# Patient Record
Sex: MALE | Race: WHITE | NOT HISPANIC OR LATINO | Employment: FULL TIME | URBAN - METROPOLITAN AREA
[De-identification: names, ages, dates, MRNs, and addresses within clinical notes are randomized per-mention and may not be internally consistent; named-entity substitution may affect disease eponyms.]

---

## 2018-11-21 ENCOUNTER — OFFICE VISIT (OUTPATIENT)
Dept: NEUROLOGY | Facility: CLINIC | Age: 28
End: 2018-11-21
Payer: COMMERCIAL

## 2018-11-21 VITALS
SYSTOLIC BLOOD PRESSURE: 149 MMHG | RESPIRATION RATE: 16 BRPM | DIASTOLIC BLOOD PRESSURE: 83 MMHG | HEART RATE: 73 BPM | WEIGHT: 201.6 LBS

## 2018-11-21 DIAGNOSIS — G40.309 EPILEPSY, GENERALIZED, CONVULSIVE (H): Primary | ICD-10-CM

## 2018-11-21 RX ORDER — LEVETIRACETAM 1000 MG/1
2000 TABLET ORAL 2 TIMES DAILY
Qty: 120 TABLET | Refills: 11 | Status: SHIPPED | OUTPATIENT
Start: 2018-11-21 | End: 2019-12-19

## 2018-11-21 ASSESSMENT — PAIN SCALES - GENERAL: PAINLEVEL: NO PAIN (0)

## 2018-11-21 NOTE — LETTER
2018       RE: Jatin Norris  : 1990   MRN: [id number]      Dear Colleague,    Thank you for referring your patient, Jatin Norris, to the [facility] EPILEPSY CARE at [facility]. Please see a copy of my visit note below.      [facility] Epilepsy Care History and Physical       Patient:  Jatin Norris  :  1990   Age:  28 year old   Today's Office Visit:  2018    Previous epileptologist: Gela Wilkes, [facility]    History of Present Illness:    Jatin Norris is a 28M who presents to [facility] with a diagnosis of epilepsy. At the age of 15 (roughly [id number]) he had his first GTC while playing a computer game. His family reported that he seemed confused and had word finding difficulty, then had a generalized tonic-clonic seizure. He was initially treated with CBZ but had hives. He was then put on LMT, then LMT + LEV. He ultimately went on LEV monotherapy but due to compliance and dosing issues, continued to have 1-2 GTCs/year, until  when he was consistently taking LEV 2G BID; he has been seizure free since 2014. He recalls 2-3 admissions for 3 day vEEG monitoring where one of the studies had interictal abnormalities, but no seizures recorded. His brain MRI was reportedly normal. In total he suspects he has had 10-20 GTCs in his lifetime. There are no clear provoking factors other than compliance and possibly sleep deprivation. He denies a typical aura but recalls feeling confused with word finding difficulty prior to the events. Seizures are associated with tongue biting but no urinary incontinence. He once had two seizures that clustered but otherwise denies history of status epilepticus. He has never had any other type of spell, staring spells or automatisms. He generally tolerates the LEV well but thinks he is a little more irritable and anxious on it. However, he has not had any 'melt downs' and these symptoms are  mild, not interfering with his work or personal life. He now has near perfect compliance and carries an extra dose of LEV in case he misses a dose.  He has no known epilepsy risk factors: no history of febrile seizures, childhood illnesses or admissions, concussion/trauma, family history of epilepsy.  He developed normally and completed college. 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 He is happily . He is originally from [place], but most recently came from [place]. He moved to [place] 1/2018. He has alcohol once every two weeks, denies tobacco or recreational drug use. He has no history of physical, emotional or sexual abuse.   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 Currently meets criteria to continue to drive.  Previous Evaluations for Epilepsy:   Patient reports 2-3 three hour vEEGs where one study had interictal abnormalities but no seizures recorded  MRI of Brain: reportedly normal  Review of Systems:  Lethargy / Tiredness:  No  Nausea / Vomiting:  No  Double Vision:  No  Sleepiness:  No  Depression:  No  Slowed Cognitive Function:  No  Memory Problems:  No  Poor Balance:  No  Dizziness:  No  Appetite Changes:  No  Blurred Vision:  No  Decreased Libido:  No  Sleep Changes:  No  Behavioral Changes:  No  Skin: negative  Respiratory: No shortness of breath, dyspnea on  exertion, cough, or hemoptysis  Cardiovascular: negative  Have you experienced a traumatic fall related to your events: No  Are these falls related to your seizures: No    Current Outpatient Prescriptions   Medication Sig Dispense Refill     levETIRAcetam (KEPPRA) 1000 MG tablet Take 2 tablets (2,000 mg) by mouth 2 times daily 2 tabs twice daily 120 tablet 11     [DISCONTINUED] LevETIRAcetam (KEPPRA PO) Take 1,000 mg by mouth 2 tabs twice daily         Perceived AED Side Effects: Yes mild anxiety and irritability on LEV, as above    Medication Notes:   AED Medication Compliance:  compliant most of the time and carries extra dose incase he misses one    Past AEDs:  AED - ANTIEPILEPTIC DRUGS 11/21/2018   levETIRAcetam (Oral) 2,000 MG BID + Take 1,000 mg by mouth 2 tabs twice daily-Discontinued (Reorder)       Exam:    /83  Pulse 73  Resp 16  Wt 201 lb 9.6 oz (91.4 kg)     Wt Readings from Last 5 Encounters:   11/21/18 201 lb 9.6 oz (91.4 kg)       General Appearance: Normal  Skin: Normal  HEENT: Not Examined  Heart: Not Examined  Peripheral Pulses: Normal  Abdomen: Not Examined  Gait:  Normal  Attention Span:  Normal  Language:  Normal  Extraocular Movements:  Normal  Coordination:  Normal  Visual Fields:  Normal  Facial Sensation:  Normal  Facial Strength:  Normal  Tongue Strength:  Normal  Limb Strength:  Normal  Limb Tone:  Normal  Limb Sensation:  Normal  General Physical Findings:  Normal    MS: Alert, oriented, interacting appropriately. Language intact to conversation.   CN: CELESTINA, EOMI without nystagmus, VFF, face symmetric, tongue midline, not dysarthric, shoulder shrug symmetric.  Motor: No abnormal movements or tone. Full strength at deltoids, biceps, triceps, hip flexors, knee extensors, ankles. Finger tapping symmetric.  Reflexes: 2/4 at BR, biceps, patellars. No ankle clonus.   Coordination: FNF not dysmetric.   Sensation: Intact to light touch and vibration in all four.  Gait: Normal base and  stride. Able to perform tandem gait.     Assessment and Plan:   Jatin Norris is a 28M who presents to establish care for a diagnosis of epilepsy. He has only one seizure semiology, generalized tonic clonic seizures. 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 By the patient's report, he has had 10-20 GTCs since the age of 15 but he has been seizure free since 2014 when he established good compliance. Overall he is tolerating LEV well but would like to try generic LEV for financial reasons. We discussed that we can check a level today and once his brand name drug runs out, he can begin generic LEV with repeat level 1-2 weeks later. Before this, however, we would like to get a 3 hour EEG to better understand his epilepsy diagnosis. He plans to send the MRI report to us. Plan to see this patient back in three months.     We reviewed [place] regulations on seizures and driving with the patient and they appeared to clearly understand that they are responsible for reporting diagnosis of epilepsy to the DMV and prohibited from operating a motor vehicle for 3 months following any seizure. I also recommended they avoid any activities that might lead to self-injury or injury of others for 3 months following any seizure with impaired awareness or motor control like holding young children at heights from which they might be injured if dropped, avoiding situations in which they or someone else might drown without their continuous awareness, and operating power tools, firearms, and other high-powered devices.    - Check LEV level today  - Three hour vEEG   - Then, plan transition to generic levetiracetam 2000 mg BID   - Recheck LEV level 1-2 weeks after beginning generic LEV  - Return to clinic in 3 months     As described above, I met with the patient for 60 minutes and during this time counseling was greater  than 50% of the visit time.    The patient was seen and discussed with Dr. Cody.    Valerie Bay  Neurology PGY3    Report Prepared By: Valerie Bay MD, Neurology Resident  I agree with the findings and plan of care as documented.  I personally examined the patient, with whom I discussed our diagnostic impressions and therapeutic recommendations.       I spent 65 minutes in this patient care, more than 50% of which consisted of counseling and coordinating care.         Samuel Cody M.D.   Professor of Neurology      Again, thank you for allowing me to participate in the care of your patient.      Sincerely,    Samuel Cody MD

## 2018-11-21 NOTE — LETTER
December 8, 2018      Jatin Norris  [address]   [place] [address]        Dear ,    We are writing to inform you of your test results.      The levetiracetam level was below the target range for seizure control.  You should be certain that you are taking the full prescribed dose every day.      Resulted Orders   Keppra (Levetiracetam) Level   Result Value Ref Range    Keppra (Levetiracetam) Level 42 12 - 46 ug/mL      Comment:      (Note)  INTERPRETIVE INFORMATION: Keppra (Levetiracetam)  Therapeutic Range:  12-46 ug/mL             Toxic:  Not well Established  Pharmacokinetics of levetiracetam are affected by renal   function. Adverse effects may include somnolence, weakness,   headache and vomiting.  This levetiracetam (Keppra) immunoassay uses the [employer] reagents, which has known cross-reactivity with   the drug brivaracetam (Briviact) and may report inaccurate   results. Patients transitioning from levetiracetam to   brivaracetam or those who are using both medications should   not monitor drug concentrations with the [employer]   assay. These patients should be monitored using a validated   chromatographic methodology that distinguishes between   drugs to determine drug concentrations.  Performed by [facility],  [address] [place] [id number] [phone]  [email or web address], Armando Song MD, Lab. Director         If you have any questions or concerns, please call the clinic at the number listed above.       Sincerely,        Samuel Cody MD

## 2018-11-21 NOTE — MR AVS SNAPSHOT
After Visit Summary   2018    Jatin Norris    MRN: [id number]           Patient Information     Date Of Birth          1990        Visit Information        Provider Department      2018 10:30 AM Samuel Cody MD [facility]        Today's Diagnoses     Epilepsy, generalized, convulsive (H)    -  1       Follow-ups after your visit        Follow-up notes from your care team     Return in about 3 months (around 2019).      Your next 10 appointments already scheduled     Mar 13, 2019  2:30 PM CDT   Return Visit with MD [facility] ([facility])    [address], [address]  [facility] [address] [phone]              Future tests that were ordered for you today     Open Future Orders        Priority Expected Expires Ordered    EEG video monitoring Routine 2018 3/21/2019 2018            Who to contact     Please call your clinic at [phone] to:    Ask questions about your health    Make or cancel appointments    Discuss your medicines    Learn about your test results    Speak to your doctor            Additional Information About Your Visit        MyChart Information     Smart Checkout is an electronic gateway that provides easy, online access to your medical records. With Smart Checkout, you can request a clinic appointment, read your test results, renew a prescription or communicate with your care team.     To sign up for Smart Checkout visit the website at [email or web address]   You will be asked to enter the access code listed below, as well as some personal information. Please follow the directions to create your username and password.     Your access code is: [email or web address]  Expires: 2019  4:55 PM     Your access code will  in 90 days. If you need help or a new code, please contact your [facility] or call [phone] for assistance.        Care EveryWhere ID      This is your Care EveryWhere ID. This could be used by other organizations to access your [facility] medical records  [id number]        Your Vitals Were     Pulse Respirations                73 16           Blood Pressure from Last 3 Encounters:   11/21/18 149/83    Weight from Last 3 Encounters:   11/21/18 201 lb 9.6 oz (91.4 kg)              We Performed the Following     Keppra (Levetiracetam) Level          Today's Medication Changes          These changes are accurate as of 11/21/18 11:31 AM.  If you have any questions, ask your nurse or doctor.               These medicines have changed or have updated prescriptions.        Dose/Directions    levETIRAcetam 1000 MG tablet   Commonly known as:  KEPPRA   This may have changed:    - medication strength  - how much to take  - when to take this   Used for:  Epilepsy, generalized, convulsive (H)   Changed by:  Samuel oCdy MD        Dose:  2000 mg   Take 2 tablets (2,000 mg) by mouth 2 times daily 2 tabs twice daily   Quantity:  120 tablet   Refills:  11            Where to get your medicines      These medications were sent to [id number] [address]. [address] [place] [id number]     Phone:  [phone]     levETIRAcetam 1000 MG tablet                Primary Care Provider    None Specified       No primary provider on file.        Equal Access to Services     DEANGELO VALENTINE AH: Grace fitzgeraldo Soasad, waaxda luqadaha, qaybta kaalmada adeegyada, hailey blanco. So [facility] [phone].    ATENCIÓN: Si habla español, tiene a camejo disposición servicios gratuitos de asistencia lingüística. Layne al [phone].    We comply with applicable federal civil rights laws and [place] laws. We do not discriminate on the basis of race, color, national origin, age, disability, sex, sexual orientation, or gender identity.            Thank you!     Thank you for choosing [facility] EPILEPSY [facility]   for your care. Our goal is always to provide you with excellent care. Hearing back from our patients is one way we can continue to improve our services. Please take a few minutes to complete the written survey that you may receive in the mail after your visit with us. Thank you!             Your Updated Medication List - Protect others around you: Learn how to safely use, store and throw away your medicines at www.disposemymeds.org.          This list is accurate as of 11/21/18 11:31 AM.  Always use your most recent med list.                   Brand Name Dispense Instructions for use Diagnosis    levETIRAcetam 1000 MG tablet    KEPPRA    120 tablet    Take 2 tablets (2,000 mg) by mouth 2 times daily 2 tabs twice daily    Epilepsy, generalized, convulsive (H)

## 2018-11-21 NOTE — LETTER
Date:November 27, 2018      Patient was self referred, no letter generated. Do not send.        [facility] Information

## 2018-11-21 NOTE — PROGRESS NOTES
[facility]       Patient:  Jatin Norris  :  1990   Age:  28 year old   Today's Office Visit:  2018    Previous epileptologist: Gela Wilkes, [facility]    History of Present Illness:    Jatin Norris is a 28M who presents to [facility] with a diagnosis of epilepsy. At the age of 15 (roughly [id number]) he had his first GTC while playing a computer game. His family reported that he seemed confused and had word finding difficulty, then had a generalized tonic-clonic seizure. He was initially treated with CBZ but had hives. He was then put on LMT, then LMT + LEV. He ultimately went on LEV monotherapy but due to compliance and dosing issues, continued to have 1-2 GTCs/year, until  when he was consistently taking LEV 2G BID; he has been seizure free since . He recalls 2-3 admissions for 3 day vEEG monitoring where one of the studies had interictal abnormalities, but no seizures recorded. His brain MRI was reportedly normal. In total he suspects he has had 10-20 GTCs in his lifetime. There are no clear provoking factors other than compliance and possibly sleep deprivation. He denies a typical aura but recalls feeling confused with word finding difficulty prior to the events. Seizures are associated with tongue biting but no urinary incontinence. He once had two seizures that clustered but otherwise denies history of status epilepticus. He has never had any other type of spell, staring spells or automatisms. He generally tolerates the LEV well but thinks he is a little more irritable and anxious on it. However, he has not had any 'melt downs' and these symptoms are mild, not interfering with his work or personal life. He now has near perfect compliance and carries an extra dose of LEV in case he misses a dose.  He has no known epilepsy risk factors: no history of febrile seizures, childhood illnesses or admissions, concussion/trauma,  family history of epilepsy.  He developed normally and completed college. 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 He is originally from [place], but most recently came from [place]. He moved to [place] 1/2018. He has alcohol once every two weeks, denies tobacco or recreational drug use. He has no history of physical, emotional or sexual abuse.   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 Currently meets criteria to continue to drive.  Previous Evaluations for Epilepsy:   Patient reports 2-3 three hour vEEGs where one study had interictal abnormalities but no seizures recorded  MRI of Brain: reportedly normal  Review of Systems:  Lethargy / Tiredness:  No  Nausea / Vomiting:  No  Double Vision:  No  Sleepiness:  No  Depression:  No  Slowed Cognitive Function:  No  Memory Problems:  No  Poor Balance:  No  Dizziness:  No  Appetite Changes:  No  Blurred Vision:  No  Decreased Libido:  No  Sleep Changes:  No  Behavioral Changes:  No  Skin: negative  Respiratory: No shortness of breath, dyspnea on exertion, cough, or hemoptysis  Cardiovascular: negative  Have you experienced a traumatic fall related to your events: No  Are these falls related to your seizures: No    Current Outpatient Prescriptions   Medication Sig Dispense Refill     levETIRAcetam (KEPPRA) 1000 MG  tablet Take 2 tablets (2,000 mg) by mouth 2 times daily 2 tabs twice daily 120 tablet 11     [DISCONTINUED] LevETIRAcetam (KEPPRA PO) Take 1,000 mg by mouth 2 tabs twice daily         Perceived AED Side Effects: Yes mild anxiety and irritability on LEV, as above    Medication Notes:   AED Medication Compliance:  compliant most of the time and carries extra dose incase he misses one    Past AEDs:  AED - ANTIEPILEPTIC DRUGS 11/21/2018   levETIRAcetam (Oral) 2,000 MG BID + Take 1,000 mg by mouth 2 tabs twice daily-Discontinued (Reorder)       Exam:    /83  Pulse 73  Resp 16  Wt 201 lb 9.6 oz (91.4 kg)     Wt Readings from Last 5 Encounters:   11/21/18 201 lb 9.6 oz (91.4 kg)       General Appearance: Normal  Skin: Normal  HEENT: Not Examined  Heart: Not Examined  Peripheral Pulses: Normal  Abdomen: Not Examined  Gait:  Normal  Attention Span:  Normal  Language:  Normal  Extraocular Movements:  Normal  Coordination:  Normal  Visual Fields:  Normal  Facial Sensation:  Normal  Facial Strength:  Normal  Tongue Strength:  Normal  Limb Strength:  Normal  Limb Tone:  Normal  Limb Sensation:  Normal  General Physical Findings:  Normal    MS: Alert, oriented, interacting appropriately. Language intact to conversation.   CN: CELESTINA, EOMI without nystagmus, VFF, face symmetric, tongue midline, not dysarthric, shoulder shrug symmetric.  Motor: No abnormal movements or tone. Full strength at deltoids, biceps, triceps, hip flexors, knee extensors, ankles. Finger tapping symmetric.  Reflexes: 2/4 at BR, biceps, patellars. No ankle clonus.   Coordination: FNF not dysmetric.   Sensation: Intact to light touch and vibration in all four.  Gait: Normal base and stride. Able to perform tandem gait.     Assessment and Plan:   Jatin Norris is a 28M who presents to establish care for a diagnosis of epilepsy. He has only one seizure semiology, generalized tonic clonic seizures. History is most consistent with an idiopathic  generalized epilepsy, though we are awaiting records of previous EEGs and MRI from [facility]. By the patient's report, he has had 10-20 GTCs since the age of 15 but he has been seizure free since 2014 when he established good compliance. Overall he is tolerating LEV well but would like to try generic LEV for financial reasons. We discussed that we can check a level today and once his brand name drug runs out, he can begin generic LEV with repeat level 1-2 weeks later. Before this, however, we would like to get a 3 hour EEG to better understand his epilepsy diagnosis. He plans to send the MRI report to us. Plan to see this patient back in three months.     We reviewed [place] regulations on seizures and driving with the patient and they appeared to clearly understand that they are responsible for reporting diagnosis of epilepsy to the DMV and prohibited from operating a motor vehicle for 3 months following any seizure. I also recommended they avoid any activities that might lead to self-injury or injury of others for 3 months following any seizure with impaired awareness or motor control like holding young children at heights from which they might be injured if dropped, avoiding situations in which they or someone else might drown without their continuous awareness, and operating power tools, firearms, and other high-powered devices.    - Check LEV level today  - Three hour vEEG   - Then, plan transition to generic levetiracetam 2000 mg BID   - Recheck LEV level 1-2 weeks after beginning generic LEV  - Return to clinic in 3 months     As described above, I met with the patient for 60 minutes and during this time counseling was greater than 50% of the visit time.    The patient was seen and discussed with Dr. Cody.    Valerie Bay  Neurology PGY3    Report Prepared By: Valerie Bay MD, Neurology Resident  I agree with the findings and plan of care as documented.  I personally examined the  patient, with whom I discussed our diagnostic impressions and therapeutic recommendations.       I spent 65 minutes in this patient care, more than 50% of which consisted of counseling and coordinating care.         Samuel Cody M.D.   Professor of Neurology

## 2018-11-23 LAB — LEVETIRACETAM SERPL-MCNC: 42 UG/ML (ref 12–46)

## 2019-12-18 DIAGNOSIS — G40.309 EPILEPSY, GENERALIZED, CONVULSIVE (H): Primary | ICD-10-CM

## 2019-12-19 RX ORDER — LEVETIRACETAM 1000 MG/1
TABLET ORAL
Qty: 120 TABLET | Refills: 6 | Status: SHIPPED | OUTPATIENT
Start: 2019-12-19 | End: 2020-01-29

## 2020-01-29 ENCOUNTER — OFFICE VISIT (OUTPATIENT)
Dept: NEUROLOGY | Facility: CLINIC | Age: 30
End: 2020-01-29
Payer: COMMERCIAL

## 2020-01-29 VITALS — DIASTOLIC BLOOD PRESSURE: 75 MMHG | HEART RATE: 73 BPM | WEIGHT: 214.4 LBS | SYSTOLIC BLOOD PRESSURE: 148 MMHG

## 2020-01-29 DIAGNOSIS — G40.309 EPILEPSY, GENERALIZED, CONVULSIVE (H): Primary | ICD-10-CM

## 2020-01-29 RX ORDER — LEVETIRACETAM 1000 MG/1
2000 TABLET ORAL
Qty: 360 TABLET | Refills: 3 | Status: SHIPPED | OUTPATIENT
Start: 2020-01-29 | End: 2021-02-08

## 2020-01-29 ASSESSMENT — PAIN SCALES - GENERAL: PAINLEVEL: NO PAIN (0)

## 2020-01-29 NOTE — PROGRESS NOTES
[facility]  Epilepsy Clinic: RETURN VISIT           Service Date: 01/29/2020     Last time visit date: 11/21/2018     History of Present Illness and ictal semiology:    Jatin Norris is a 29 M who presents to [facility] care with a diagnosis of epilepsy. At the age of 15 (roughly [id number]) he had his first GTC while playing a computer game. His family reported that he seemed confused and had word finding difficulty, then had a generalized tonic-clonic seizure.     Interval history:   He was low on keppra and he needed a new prescription, insurance did not cover keppra, they cover the generic levetiracetam. No seizure since the last time. He feels his anxiety level is up he thinks. It seems more pronounced. It does affect his work. At home he feels he is little on edge.     He was initially treated with CBZ but had hives. He was then put on LMT, then LMT + LEV. He ultimately went on LEV monotherapy but due to compliance and dosing issues, continued to have 1-2 GTCs/year, until 2004 when he was consistently taking LEV 2G BID; he has been seizure free since 2014. He recalls 2-3 admissions for 3 day vEEG monitoring where one of the studies had interictal abnormalities, but no seizures recorded. His brain MRI was reportedly normal. In total he suspects he has had 10-20 GTCs in his lifetime. He denies a typical aura but recalls feeling confused with word finding difficulty prior to the events. Seizures are associated with tongue biting but no urinary incontinence. He once had two seizures that clustered but otherwise denies history of status epilepticus. He has never had any other type of spell, staring spells or automatisms. He generally tolerates the LEV well but thinks he is a little more irritable and anxious on it. However, he has not had any 'melt downs' and these symptoms are mild, not interfering with his work or personal life. He now has near perfect compliance and carries an extra dose of LEV in case he  misses a dose.    Epilepsy-seizure predispositions:   He has no known epilepsy risk factors: no history of febrile seizures, childhood illnesses or admissions, concussion/trauma, family history of epilepsy. He developed normally    Laboratory evaluations:   Brain MRI was reportedly normal  He recalls 2-3 admissions for 3 day vEEG monitoring where one of the studies had interictal abnormalities, but no seizures recorded.    Epilepsy therapeutics:   1-Current AEDs:  levetiracetam 2000 mg BID   2-Failed AEDs: carbamazepine caused hives  Lamotrigine was stopped for unknown reason    Drug levels: on 11/21/2018:  levetiracetam level 42    PERSONAL AND SOCIAL HISTORY:  completed college. 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 He is happily . He is originally from [place], but most recently came from [place]. He moved to [place] 1/2018. He has alcohol once every two weeks, denies tobacco or recreational drug use. He has no history of physical, emotional or sexual abuse.     Epilepsy Risk Factors:  None  Precipitating factors:   Failure to take AED  No PMH, PSH, no pertinent FH     Social History            Social History     Marital status:        Spouse name: N/A     Number of children: 0     Years of education: College degree              Social History Main Topics      Smoking status: Never Smoker      Smokeless tobacco: Never Used      Alcohol use Yes         Comment: every couple weeks      Drug use: No      Sexual activity: Not Asked            Other Topics Concern     None      Social History Narrative     None      Employment/School:  Employed full time as salesman for [employer], see above  Driving:  Currently patient is:  Driving: Patient was made aware of [place] driving laws. Currently meets criteria to continue to drive.  Previous Evaluations for Epilepsy:   Patient reports 2-3 three hour vEEGs where one study had interictal abnormalities but no seizures recorded  MRI of Brain: reportedly normal  Review of  Systems:  Lethargy / Tiredness:  No  Nausea / Vomiting:  No  Double Vision:  No  Sleepiness:  No  Depression:  No  Slowed Cognitive Function:  No  Memory Problems:  No  Poor Balance:  No  Dizziness:  No  Appetite Changes:  No  Blurred Vision:  No  Decreased Libido:  No  Sleep Changes:  No  Behavioral Changes:  No  Skin: negative  Respiratory: No shortness of breath, dyspnea on exertion, cough, or hemoptysis  Cardiovascular: negative  Have you experienced a traumatic fall related to your events: No  Are these falls related to your seizures: No     Current Outpatient Prescriptions          Current Outpatient Prescriptions   Medication Sig Dispense Refill     levETIRAcetam (KEPPRA) 1000 MG tablet Take 2 tablets (2,000 mg) by mouth 2 times daily 2 tabs twice daily 120 tablet 11     [DISCONTINUED] LevETIRAcetam (KEPPRA PO) Take 1,000 mg by mouth 2 tabs twice daily                Perceived AED Side Effects: Yes mild anxiety and irritability on LEV, as above     Medication Notes:   AED Medication Compliance:  compliant most of the time and carries extra dose incase he misses one     Past AEDs:  AED - ANTIEPILEPTIC DRUGS 11/21/2018   levETIRAcetam (Oral) 2,000 MG BID + Take 1,000 mg by mouth 2 tabs twice daily-Discontinued (Reorder)         Exam:     /83  Pulse 73  Resp 16  Wt 201 lb 9.6 oz (91.4 kg)          Wt Readings from Last 5 Encounters:   11/21/18 201 lb 9.6 oz (91.4 kg)         General Appearance: Normal  Skin: Normal  HEENT: Not Examined  Heart: Not Examined  Peripheral Pulses: Normal  Abdomen: Not Examined  Gait:  Normal  Attention Span:  Normal  Language:  Normal  Extraocular Movements:  Normal  Coordination:  Normal  Visual Fields:  Normal  Facial Sensation:  Normal  Facial Strength:  Normal  Tongue Strength:  Normal  Limb Strength:  Normal  Limb Tone:  Normal  Limb Sensation:  Normal  General Physical Findings:  Normal     MS: Alert, oriented, interacting appropriately. Language intact to  conversation.   CN: CELESTINA, EOMI without nystagmus, VFF, face symmetric, tongue midline, not dysarthric, shoulder shrug symmetric.  Motor: No abnormal movements or tone. Full strength at deltoids, biceps, triceps, hip flexors, knee extensors, ankles. Finger tapping symmetric.  Reflexes: 2/4 at BR, biceps, patellars. No ankle clonus.   Coordination: FNF not dysmetric.   Sensation: Intact to light touch and vibration in all four.  Gait: Normal base and stride. Able to perform tandem gait.      Assessment and Plan:   Jatin Norris is a 29 M who presents to epilepsy clinic for follow up today. He has idiopathic generalized epilepsy that was well controlled on  levetiracetam 2000 mg BID.     He has had mild anxiety, and is uncertain as to whether levetiracetam is a contributory factor.  He also noted work stressors related to a failed project recently and home stressors related to planning an upcoming wedding as anxiety-provoking circumstances.  Overal, e does not want to discontinue or reduce dosing of levetiracetam.     We reviewed [place] regulations on seizures and driving with the patient and they appeared to clearly understand that they are responsible for reporting diagnosis of epilepsy to the DMV and prohibited from operating a motor vehicle for 3 months following any seizure. I also recommended they avoid any activities that might lead to self-injury or injury of others for 3 months following any seizure with impaired awareness or motor control like holding young children at heights from which they might be injured if dropped, avoiding situations in which they or someone else might drown without their continuous awareness, and operating power tools, firearms, and other high-powered devices.     Plan:  -Continue levetiracetam 2000 mg BID   -Return to clinic in 11 months     The patient was seen and discussed with Dr. Escobar Hobbs  Neurology resident   Pager: [phone]    Report Prepared By: Joseph  MD Anjel, Neurology Resident   I agree with the findings and plan of care as documented.  I personally examined the patient, and discussed our epilepsy diagnostic impressions and therapeutic recommendations with the patient.  She was agreeable to this plan.       I spent 25 minutes in this patient care, more than 50% of which consisted of counseling and coordinating care.          Samuel Cody M.D.   Professor of Neurology

## 2020-01-29 NOTE — LETTER
Date:January 30, 2020      Patient was self referred, no letter generated. Do not send.        [facility] Health Information

## 2020-01-29 NOTE — LETTER
2020       RE: Jatin Norris  : 1990   MRN: [id number]      Dear Colleague,    Thank you for referring your patient, Jatin Norris, to the [facility] EPILEPSY CARE at [facility]. Please see a copy of my visit note below.      [facility]  Epilepsy Clinic: RETURN VISIT           Service Date: 2020     Last time visit date: 2018     History of Present Illness and ictal semiology:    Jatin Norris is a 29 M who presents to [facility] care with a diagnosis of epilepsy. At the age of 15 (roughly [id number]) he had his first GTC while playing a computer game. His family reported that he seemed confused and had word finding difficulty, then had a generalized tonic-clonic seizure.     Interval history:   He was low on keppra and he needed a new prescription, insurance did not cover keppra, they cover the generic levetiracetam. No seizure since the last time. He feels his anxiety level is up he thinks. It seems more pronounced. It does affect his work. At home he feels he is little on edge.     He was initially treated with CBZ but had hives. He was then put on LMT, then LMT + LEV. He ultimately went on LEV monotherapy but due to compliance and dosing issues, continued to have 1-2 GTCs/year, until 2004 when he was consistently taking LEV 2G BID; he has been seizure free since 2014. He recalls 2-3 admissions for 3 day vEEG monitoring where one of the studies had interictal abnormalities, but no seizures recorded. His brain MRI was reportedly normal. In total he suspects he has had 10-20 GTCs in his lifetime. He denies a typical aura but recalls feeling confused with word finding difficulty prior to the events. Seizures are associated with tongue biting but no urinary incontinence. He once had two seizures that clustered but otherwise denies history of status epilepticus. He has never had any other type of spell, staring spells or automatisms. He generally  tolerates the LEV well but thinks he is a little more irritable and anxious on it. However, he has not had any 'melt downs' and these symptoms are mild, not interfering with his work or personal life. He now has near perfect compliance and carries an extra dose of LEV in case he misses a dose.    Epilepsy-seizure predispositions:   He has no known epilepsy risk factors: no history of febrile seizures, childhood illnesses or admissions, concussion/trauma, family history of epilepsy. He developed normally    Laboratory evaluations:   Brain MRI was reportedly normal  He recalls 2-3 admissions for 3 day vEEG monitoring where one of the studies had interictal abnormalities, but no seizures recorded.    Epilepsy therapeutics:   1-Current AEDs:  levetiracetam 2000 mg BID   2-Failed AEDs: carbamazepine caused hives  Lamotrigine was stopped for unknown reason    Drug levels: on 11/21/2018:  levetiracetam level 42    PERSONAL AND SOCIAL HISTORY:  completed college. 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 He is originally from [place], but most recently came from [place]. He moved to [place] 1/2018. He has alcohol once every two weeks, denies tobacco or recreational drug use. He has no history of physical, emotional or sexual abuse.     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 Currently meets criteria to continue to drive.  Previous Evaluations for Epilepsy:   Patient reports 2-3 three hour vEEGs where one study had interictal abnormalities but no seizures recorded  MRI of Brain: reportedly normal  Review of Systems:  Lethargy / Tiredness:  No  Nausea / Vomiting:  No  Double Vision:  No  Sleepiness:  No  Depression:  No  Slowed Cognitive Function:  No  Memory Problems:  No  Poor Balance:  No  Dizziness:  No  Appetite Changes:  No  Blurred Vision:  No  Decreased Libido:  No  Sleep Changes:  No  Behavioral Changes:  No  Skin: negative  Respiratory: No shortness of breath, dyspnea on exertion, cough, or hemoptysis  Cardiovascular: negative  Have you experienced a traumatic fall related to your events: No  Are these falls related to your seizures: No     Current Outpatient Prescriptions          Current Outpatient Prescriptions   Medication Sig Dispense Refill     levETIRAcetam (KEPPRA) 1000 MG tablet Take 2 tablets (2,000 mg) by mouth 2 times daily 2 tabs twice daily 120 tablet 11     [DISCONTINUED] LevETIRAcetam (KEPPRA PO) Take 1,000 mg by mouth 2 tabs twice daily                Perceived AED Side Effects: Yes mild anxiety and irritability on LEV, as above     Medication Notes:   AED Medication Compliance:  compliant most of the time and carries extra dose incase he misses one     Past AEDs:  AED - ANTIEPILEPTIC DRUGS 11/21/2018   levETIRAcetam (Oral) 2,000 MG BID + Take 1,000 mg by mouth 2 tabs twice daily-Discontinued (Reorder)         Exam:     /83  Pulse 73  Resp 16  Wt 201 lb 9.6 oz (91.4 kg)          Wt Readings from Last 5 Encounters:   11/21/18 201 lb 9.6 oz (91.4 kg)         General Appearance: Normal  Skin: Normal  HEENT: Not Examined  Heart: Not Examined  Peripheral Pulses: Normal  Abdomen: Not Examined  Gait:  Normal  Attention Span:  Normal  Language:  Normal  Extraocular Movements:  Normal  Coordination:   Normal  Visual Fields:  Normal  Facial Sensation:  Normal  Facial Strength:  Normal  Tongue Strength:  Normal  Limb Strength:  Normal  Limb Tone:  Normal  Limb Sensation:  Normal  General Physical Findings:  Normal     MS: Alert, oriented, interacting appropriately. Language intact to conversation.   CN: CELESTINA, EOMI without nystagmus, VFF, face symmetric, tongue midline, not dysarthric, shoulder shrug symmetric.  Motor: No abnormal movements or tone. Full strength at deltoids, biceps, triceps, hip flexors, knee extensors, ankles. Finger tapping symmetric.  Reflexes: 2/4 at BR, biceps, patellars. No ankle clonus.   Coordination: FNF not dysmetric.   Sensation: Intact to light touch and vibration in all four.  Gait: Normal base and stride. Able to perform tandem gait.      Assessment and Plan:   Jatin Norris is a 29 M who presents to epilepsy clinic for follow up today. He has idiopathic generalized epilepsy that was well controlled on  levetiracetam 2000 mg BID.     He has had mild anxiety, and is uncertain as to whether levetiracetam is a contributory factor.  He also noted work stressors related to a failed project recently and home stressors related to planning an upcoming wedding as anxiety-provoking circumstances.  Overal, e does not want to discontinue or reduce dosing of levetiracetam.     We reviewed [place] regulations on seizures and driving with the patient and they appeared to clearly understand that they are responsible for reporting diagnosis of epilepsy to the DMV and prohibited from operating a motor vehicle for 3 months following any seizure. I also recommended they avoid any activities that might lead to self-injury or injury of others for 3 months following any seizure with impaired awareness or motor control like holding young children at heights from which they might be injured if dropped, avoiding situations in which they or someone else might drown without their continuous awareness, and  operating power tools, firearms, and other high-powered devices.     Plan:  -Continue levetiracetam 2000 mg BID   -Return to clinic in 11 months     The patient was seen and discussed with Dr. Escobar Hobbs  Neurology resident   Pager: [phone]    Report Prepared By: Joseph Hobbs MD, Neurology Resident   I agree with the findings and plan of care as documented.  I personally examined the patient, and discussed our epilepsy diagnostic impressions and therapeutic recommendations with the patient.  She was agreeable to this plan.       I spent 25 minutes in this patient care, more than 50% of which consisted of counseling and coordinating care.          Samuel Cody M.D.   Professor of Neurology       Again, thank you for allowing me to participate in the care of your patient.      Sincerely,    Samuel Cody MD

## 2021-02-05 DIAGNOSIS — G40.309 EPILEPSY, GENERALIZED, CONVULSIVE (H): ICD-10-CM

## 2021-02-08 NOTE — TELEPHONE ENCOUNTER
levETIRAcetam (KEPPRA) 1000 MG tablet      Last Written Prescription Date:1/29/20  Last Fill Quantity:360,   # refills: 3  Last Office Visit :  1/29/20  Future Office visit:  None    Scheduling has been notified to contact the pt for appointment.    routed because: dosing alert

## 2021-02-09 RX ORDER — LEVETIRACETAM 1000 MG/1
2000 TABLET ORAL 2 TIMES DAILY
Qty: 120 TABLET | Refills: 0 | Status: SHIPPED | OUTPATIENT
Start: 2021-02-09 | End: 2021-02-24

## 2021-02-24 ENCOUNTER — VIRTUAL VISIT (OUTPATIENT)
Dept: NEUROLOGY | Facility: CLINIC | Age: 31
End: 2021-02-24
Payer: COMMERCIAL

## 2021-02-24 DIAGNOSIS — G40.309 EPILEPSY, GENERALIZED, CONVULSIVE (H): Primary | ICD-10-CM

## 2021-02-24 RX ORDER — LEVETIRACETAM 1000 MG/1
2000 TABLET ORAL 2 TIMES DAILY
Qty: 360 TABLET | Refills: 3 | Status: SHIPPED | OUTPATIENT
Start: 2021-02-24 | End: 2022-02-18

## 2021-02-24 NOTE — PROGRESS NOTES
Jatin Norris is a 30 year old who is being evaluated via a billable video visit.    How would you like to obtain your AVS? Mail a copy  If the video visit is dropped, the invitation should be resent by: Send to e-mail at: [email or web address]  Will anyone else be joining your video visit? No      EPILEPSY CLINIC VIDEO VISIT NOTE  The scheduled clinic visit was changed to a video visit to reduce the risk of COVID-19 transmission.           Service Date: 02/24/2021    HISTORY: I spoke with Mr. 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  He was alone during the video visit today.      The patient denied having recent fever or cough, and exposure to anyone thought to have COVID-19.     Following the most recent visit to this clinic on 01/29/2020, the patient reportedly has had no seizures.  He missing doses of levetiracetam and denied adverse effects of levetiracetam.  The most recent seizure was a grand mal seizure in 2014.      He had severe right flank pain in November 2020, and passed a kidney stone.  Apparently, the stone was not recovered, so no chemical analysis occurred.  Follow-up CT imaging showed a single nonobstructing left renal calculus.  He has pursued daily hydration since then.      MEDICATIONS:  Levetiracetam 2000 mg b.i.d., and other medications as per the electronic medical record.     VIDEO OBSERVATIONS:   The patient spoke with normal articulation, fluency and syntax, with normal comprehension of questions.    On command, the patient moved the direction of gaze into all four quadrants conjugately and without nystagmus.   Facial movements were normal.    Tongue protruded on the midline.    The patient did not display any resting tremors or action tremors of the outstretched arms.  Limb movements were normal.      LABORATORY DATA:   AED levels on 11/21/2018:   Levetiracetam: 42 mcg/ml.     IMPRESSION:   The patient remains  seizure-free, with no apparent AEs of levetiracetam.  We agreed to continue the current dose.    The patient had questions concerning the relationship between levetiracetam and renal calculus formation.  I told him that this agent is unlikely to promote stone formation, based on its chemistry.  We discussed renal elimination of the drug.  Serum chemistries, obtained shortly after he had passed the stone in November 2020, included normal BUN and creatinine values.  I told him that it appears unlikely that any effects of the stones would affect levetiracetam kinetics.    He is now working entirely from home.  We reviewed COVID[employer] restrictions.     PLAN:   1)  Continue levetiracetam at the current dose.    2)  Return in 11 months for in-person clinic visit.    Video Conference via [employer].   Video Start Time: 11:30 a.m.   Video Stop Time: 11:48 a.m.   Provider location: [facility]   Reported Patient Location: Home     I personally spent 20 minutes in this patient care on the day of this visit, including time in direct contact with the patient of which more than 50% consisted of counseling and coordinating care, and time in other necessary patient care activities without direct patient contact including medical record and imaging review.       Samuel Cody M.D., Professor of Neurology

## 2021-02-24 NOTE — LETTER
2021       RE: Jatin Norris  : 1990   MRN: [id number]      Dear Colleague,    Thank you for referring your patient, Jatin Norris, to the [facility] at [facility]. Please see a copy of my visit note below.    Left messages to call back for rooming process before video appointment.         Jatin Norris is a 30 year old who is being evaluated via a billable video visit.    How would you like to obtain your AVS? Mail a copy  If the video visit is dropped, the invitation should be resent by: Send to e-mail at: [email or web address]  Will anyone else be joining your video visit? No      EPILEPSY CLINIC VIDEO VISIT NOTE  The scheduled clinic visit was changed to a video visit to reduce the risk of COVID-19 transmission.           Service Date: 2021    HISTORY: I spoke with Mr. 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  He was alone during the video visit today.      The patient denied having recent fever or cough, and exposure to anyone thought to have COVID-19.     Following the most recent visit to this clinic on 2020, the patient reportedly has had no seizures.  He missing doses of levetiracetam and denied adverse effects of levetiracetam.  The most recent seizure was a grand mal seizure in .      He had severe right flank pain in 2020, and passed a kidney stone.  Apparently, the stone was not recovered, so no chemical analysis occurred.  Follow-up CT imaging showed a single nonobstructing left renal calculus.  He has pursued daily hydration since then.      MEDICATIONS:  Levetiracetam 2000 mg b.i.d., and other medications as per the electronic medical record.     VIDEO OBSERVATIONS:   The patient spoke with normal articulation, fluency and syntax, with normal comprehension of questions.    On command, the patient moved the direction of gaze into  all four quadrants conjugately and without nystagmus.   Facial movements were normal.    Tongue protruded on the midline.    The patient did not display any resting tremors or action tremors of the outstretched arms.  Limb movements were normal.      LABORATORY DATA:   AED levels on 11/21/2018:   Levetiracetam: 42 mcg/ml.     IMPRESSION:   The patient remains seizure-free, with no apparent AEs of levetiracetam.  We agreed to continue the current dose.    The patient had questions concerning the relationship between levetiracetam and renal calculus formation.  I told him that this agent is unlikely to promote stone formation, based on its chemistry.  We discussed renal elimination of the drug.  Serum chemistries, obtained shortly after he had passed the stone in November 2020, included normal BUN and creatinine values.  I told him that it appears unlikely that any effects of the stones would affect levetiracetam kinetics.    He is now working entirely from home.  We reviewed COVID-19 restrictions.     PLAN:   1)  Continue levetiracetam at the current dose.    2)  Return in 11 months for in-person clinic visit.    Video Conference via [employer].   Video Start Time: 11:30 a.m.   Video Stop Time: 11:48 a.m.   Provider location: [facility]   Reported Patient Location: Home     I personally spent 20 minutes in this patient care on the day of this visit, including time in direct contact with the patient of which more than 50% consisted of counseling and coordinating care, and time in other necessary patient care activities without direct patient contact including medical record and imaging review.       Samuel Cody M.D., Professor of Neurology          Again, thank you for allowing me to participate in the care of your patient.      Sincerely,    Samuel Cody MD

## 2021-02-24 NOTE — LETTER
Date:February 27, 2021      Patient was self referred, no letter generated. Do not send.        [facility] Information

## 2021-04-25 ENCOUNTER — HEALTH MAINTENANCE LETTER (OUTPATIENT)
Age: 31
End: 2021-04-25

## 2021-10-10 ENCOUNTER — HEALTH MAINTENANCE LETTER (OUTPATIENT)
Age: 31
End: 2021-10-10

## 2022-02-04 ENCOUNTER — TELEPHONE (OUTPATIENT)
Dept: NEUROLOGY | Facility: CLINIC | Age: 32
End: 2022-02-04
Payer: COMMERCIAL

## 2022-02-04 NOTE — TELEPHONE ENCOUNTER
DIANAM to schedule return appt w Dr. Cody in clinic, per lloyd, provided clinic number for call back.

## 2022-02-16 DIAGNOSIS — G40.309 EPILEPSY, GENERALIZED, CONVULSIVE (H): ICD-10-CM

## 2022-02-16 NOTE — TELEPHONE ENCOUNTER
Patient called needing refills for the KEPPRA 1000mg tablets. Patient has enough medication to get through to next visit on 4/13/22.

## 2022-02-18 RX ORDER — LEVETIRACETAM 1000 MG/1
2000 TABLET ORAL 2 TIMES DAILY
Qty: 360 TABLET | Refills: 0 | Status: SHIPPED | OUTPATIENT
Start: 2022-02-18 | End: 2022-07-01

## 2022-02-18 NOTE — TELEPHONE ENCOUNTER
Refill request for levetiracetam    Chart reviewed.  Last visit with  2/24/2021  Follow up 11 months  Next appointment with  4/13/2022    Last prescription provided 2/24/2021. 3 months with 3 refills.  Patient can be provided with one refill per protocol to get him through to the appointment.

## 2022-05-21 ENCOUNTER — HEALTH MAINTENANCE LETTER (OUTPATIENT)
Age: 32
End: 2022-05-21

## 2022-07-01 DIAGNOSIS — G40.309 EPILEPSY, GENERALIZED, CONVULSIVE (H): ICD-10-CM

## 2022-07-01 RX ORDER — LEVETIRACETAM 1000 MG/1
2000 TABLET ORAL 2 TIMES DAILY
Qty: 120 TABLET | Refills: 0 | Status: SHIPPED | OUTPATIENT
Start: 2022-07-01 | End: 2022-08-03

## 2022-07-01 NOTE — TELEPHONE ENCOUNTER
[facility] Call Center    Phone Message    May a detailed message be left on voicemail: yes     Reason for Call: Medication Refill Request    Has the patient contacted the pharmacy for the refill? Yes   Name of medication being requested: levETIRAcetam (KEPPRA) 1000 MG tablet  Provider who prescribed the medication: Samuel Cody   Pharmacy: [facility] [id number] [place] [address]  Date medication is needed: ASAP    Patient is requesting a refill for the medication above. Patient is getting low, please call patient back with questions at # [phone]      Action Taken: Message routed to:  [facility] ([facility] Neurology     Travel Screening: Not Applicable

## 2022-07-01 NOTE — TELEPHONE ENCOUNTER
Medication Refill request received for   Pending Prescriptions:                       Disp   Refills    levETIRAcetam (KEPPRA) 1000 MG tablet     360 ta*0            Sig: Take 2 tablets (2,000 mg) by mouth 2 times daily      Last Written Prescription Date:  2/18/2022  Last Fill Quantity: 360,   # refills: 0 Length of last prescription in time: 3 months  Last Office Visit : 2/24/2021  Future Office visit:  None scheduled    [place] Medication Refill Protocol requirements:     Latest Reference Range & Units 11/21/18 11:30   Keppra (Levetiracetam) Level 12 - 46 ug/mL 42       Refill request was forwarded to the provider because the patient was previously given a dada supply and has not completed his follow up appointment. A voicemail was left for the patient to call back to schedule.

## 2022-08-01 DIAGNOSIS — G40.309 EPILEPSY, GENERALIZED, CONVULSIVE (H): ICD-10-CM

## 2022-08-03 RX ORDER — LEVETIRACETAM 1000 MG/1
2000 TABLET ORAL 2 TIMES DAILY
Qty: 120 TABLET | Refills: 1 | Status: SHIPPED | OUTPATIENT
Start: 2022-08-03 | End: 2022-11-11

## 2022-08-03 NOTE — TELEPHONE ENCOUNTER
LEVETIRACETAM 1000MG TABS      Last Written Prescription Date:  7-1-22  Last Fill Quantity: 120,   # refills: 0  Last Office Visit : 2-24-21  Future Office visit:  9-28-22    Routing refill request to provider for review/approval because:  Next clinic appt outside of RTC timeframe

## 2022-09-18 ENCOUNTER — HEALTH MAINTENANCE LETTER (OUTPATIENT)
Age: 32
End: 2022-09-18

## 2022-11-10 DIAGNOSIS — G40.309 EPILEPSY, GENERALIZED, CONVULSIVE (H): ICD-10-CM

## 2022-11-10 NOTE — TELEPHONE ENCOUNTER
Patient low on medication, schedule appointment with Dr. Cody for 1/4/23. Currently has no insurance but should after 1st of year

## 2022-11-11 RX ORDER — LEVETIRACETAM 1000 MG/1
2000 TABLET ORAL 2 TIMES DAILY
Qty: 120 TABLET | Refills: 1 | Status: SHIPPED | OUTPATIENT
Start: 2022-11-11 | End: 2023-01-11

## 2022-11-11 NOTE — TELEPHONE ENCOUNTER
CHart reviewed.  Last completed visit with  2/24/2021          Has an appointment scheduled for 1/4/2023    Routing to MD as protocol not met for nurse renewal

## 2023-01-07 DIAGNOSIS — G40.309 EPILEPSY, GENERALIZED, CONVULSIVE (H): ICD-10-CM

## 2023-01-11 NOTE — TELEPHONE ENCOUNTER
LEVETIRACETAM 1000MG TABS      Last Written Prescription Date:  11-11-22  Last Fill Quantity: 120,   # refills: 1  Last Office Visit : 2-24-21 ( RTC 11 M)  Future Office visit:  3-1-23    Routing refill request to provider for review/approval because:  Next appt outside of RTC timeframe  - 4 cancel/no show appt

## 2023-01-12 RX ORDER — LEVETIRACETAM 1000 MG/1
2000 TABLET ORAL 2 TIMES DAILY
Qty: 120 TABLET | Refills: 1 | Status: SHIPPED | OUTPATIENT
Start: 2023-01-12 | End: 2023-03-01

## 2023-03-01 ENCOUNTER — OFFICE VISIT (OUTPATIENT)
Dept: NEUROLOGY | Facility: CLINIC | Age: 33
End: 2023-03-01
Payer: COMMERCIAL

## 2023-03-01 VITALS
SYSTOLIC BLOOD PRESSURE: 132 MMHG | HEIGHT: 75 IN | WEIGHT: 225 LBS | DIASTOLIC BLOOD PRESSURE: 86 MMHG | TEMPERATURE: 97.2 F | BODY MASS INDEX: 27.98 KG/M2 | HEART RATE: 82 BPM

## 2023-03-01 DIAGNOSIS — F41.9 ANXIETY: ICD-10-CM

## 2023-03-01 DIAGNOSIS — G40.309 EPILEPSY, GENERALIZED, CONVULSIVE (H): Primary | ICD-10-CM

## 2023-03-01 RX ORDER — LEVETIRACETAM 1000 MG/1
2000 TABLET ORAL 2 TIMES DAILY
Qty: 120 TABLET | Refills: 1 | Status: SHIPPED | OUTPATIENT
Start: 2023-03-01 | End: 2023-05-24

## 2023-03-01 ASSESSMENT — PAIN SCALES - GENERAL: PAINLEVEL: NO PAIN (0)

## 2023-03-01 NOTE — PROGRESS NOTES
[facility]:  RETURN VISIT          Service Date: 03/01/2023     HISTORY:  I saw Mr. 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  He came alone for the visit today.      Following the most recent video visit on 02/24/2021, the patient has had no seizures and no medication adverse effects.   The most recent grand mal seizure occurred in 2014    He has had headaches in the past, but these have been more severe over the last year.  On 4 occasions in 2022, he had no aura prior to progressive, severe throbbing left frontal pain, associated with photophobia and phonophobia, not responding to acetaminophen or ibuprofen, which lasted 1-2 hours until in each case he was able to fall asleep, with no headache after sleeping.      Over the last year, he has had increasing anxiety, which mainly is triggered by preparation for and speaking to business colleagues or others, and by exposure to groups of more than a few people.  He denied continuous anxiety when alone and panic attacks.  He denied depression.    Ictal semiology-history:    The patient re-confirmed previously presented history in detail today. He again noted in conjunction with history on the medical record that he has had grand mal seizures as the only seizure type in his lifetime.    Generalized tonic clinic seizures began in approximately 2005.  He had perhaps 10-20 total GTC seizures, not occurring more than once per day, with the last seizure in 2014.  He did not have an aura or consistent warning of seizure onset, except rarely he had word-finding difficulty just at seizure onset.  Generally he had unexpected low of consciousness, finding himself down and confused later, often with a bitten tongue but not with incontinence.  Seizures were witnessed by multiple observers who reported falling and brief generalized convulsions, followed by postictal  confusion for minutes to hours.      The patient denied that he has ever had a paroxysmal episode of unresponsive staring without falling, non-convulsive falling with unconsciousness, repetitive involuntary movements or posturing, sudden brief confusion, or other paroxysmal phenomena than the grand mal seizures.  He denied any history of sudden involuntary jerks while fully awake, but he has had hypnic jerks.       The patient recalls considerable association of missed AED doses with seizure occurrence, before , but otherwise he does not recognize exacerbating or remitting factors with regard to seizure frequency.    Epilepsy-seizure predispositions:   The patient has no family history of epilepsy or seizures.  He has no history of gestational or  injury, febrile convulsions, developmental delay, stroke, meningitis, encephalitis, significant head injury, or other epileptic predispositions.  He denied a history of physical or sexual abuse by an adult during his childhood or adulthood.      Laboratory evaluations:   He reported that he was told that a brain MRI was normal and an EEG was abnormal (during in-patient VEEG), longer than 10 years ago, when he was living in [place].  He requested transfer of medical records several times, but records were not received at this clinic.      Epilepsy therapeutics:   He was initially treated with carbamazepine, but had hives.   He was then on lamotrigine, but seizures were not controlled.    He then went on to levetiracetam monotherapy but due to compliance and dosing issues, continued to have 1-2 GTCs/year, until he was consistently taking levetiracetam and became seizure free since 2014.  He denied adverse effects of levetiracetam.  A levetiracetam level was 42 mccg/ml on 2018.      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   2)  Probable migraine headaches.      PERSONAL AND  SOCIAL HISTORY:  He grew up in [place].  He completed a baccalaureate degree.  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  He lives with his wife.  He has 2-3 drinks of alcohol once every two weeks.  He denies tobacco or recreational drug use. He     REVIEW OF SYSTEMS:  The patient denied history of weakness, tremors or other abnormal involuntary movements, numbness or tingling, incoordination, falling or difficulty in walking, urinary or fecal incontinence, headache and other pain, except as described above.  The patient denied any history of depression, suicidal ideation, continuous anxiety, panic attacks, hallucinations, delusions, psychosis, substance abuse, or psychiatric hospitalization.  He denied rashes and easy bruising.  The remainder of a 10-system symptom review was negative.    MEDICATIONS:  Levetiracetam 2000 mg b.i.d., and other medications as per the electronic medical record.      PHYSICAL EXAMINATION:    The patient was alert and in no apparent distress.  Vital signs were as per the electronic medical record.  Skull was normocephalic and atraumatic.  Neck was supple, without signs of meningeal irritation.       On neurological examination the patient appeared alert and was fully oriented to person, place, time, and reason for visit.  Speech showed normal articulation, fluency, repetitions, naming, syntax and comprehension.  Cranial nerves III through XII were normal.  Muscle masses, tones and strengths were normal throughout.  There was no pronator drift.  Sequential fine finger movements were performed normally with each hand.  No spontaneous tremors, myoclonus, or other abnormal movements were observed.  Sensations of light touch, pin prick, vibration and proprioception were reportedly normal throughout.  The rapid alternating movements, and finger-nose-finger and heel-shin maneuvers were performed normally bilaterally.  Romberg  maneuver was negative.  Regular, heel, toe, tandem and reverse tandem walking were normal.  Deep tendon reflexes were normal and symmetric throughout.  Toes were downgoing bilaterally.      IMPRESSION:    The patient has had no seizures since 2014.  He denied adverse effects of levetiracetam.   We will continue the current dose.  He did agree to complete an out-patient EEG, as we never received outside records of EEGs that may have been abnormal.    He now is having severe headaches intermittently.  We will obtain a brain MRI to screen for cephalogenic lesions, although he may have common migraine headaches.  He declined to start sumatriptan or other anti-migraine therapy today, but he will consider this in the future after a brain MRI has been completed.    He has begun to experience situational anxiety that is quite bothersome.  Possibly he might have social phobia, but it would be best for him to have psychiatric evaluation to exclude CODY another pathologies.  He agreed with referral for behavioral health screening.    I reviewed [place] regulations on seizures and driving with the patient.  He appeared to clearly understand that he is prohibited from operating a motor vehicle within 3 months following any seizure or other episode with sudden unconsciousness or inability to sit up, and that he is required to report any future such seizure to the [facility] within 30 days after the event.      PLAN:    1.  Continue levetiracetam at the current dose.    2.  Brain 3T MRI.     3.  Out-patient 3hr VEEG.  .   4.  Referral [facility].    5.  Return Video visit in approximately 3 months.     I spent 52 minutes in this patient care, with 40minutes in direct patient contact, and 12 minutes in chart review and document preparation on the day of the visit.       Samuel Cody M.D.   Professor of Neurology

## 2023-03-01 NOTE — LETTER
3/1/2023       RE: Jatin Norris  : 1990   MRN: [id number]      Dear Colleague,    Thank you for referring your patient, Jatin Norris, to the [facility] EPILEPSY [facility]ARE at [facility]. Please see a copy of my visit note below.      [facility]:  RETURN VISIT          Service Date: 2023     HISTORY:  I saw Mr. 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  He came alone for the visit today.      Following the most recent video visit on 2021, the patient has had no seizures and no medication adverse effects.   The most recent grand mal seizure occurred in     He has had headaches in the past, but these have been more severe over the last year.  On 4 occasions in , he had no aura prior to progressive, severe throbbing left frontal pain, associated with photophobia and phonophobia, not responding to acetaminophen or ibuprofen, which lasted 1-2 hours until in each case he was able to fall asleep, with no headache after sleeping.      Over the last year, he has had increasing anxiety, which mainly is triggered by preparation for and speaking to business colleagues or others, and by exposure to groups of more than a few people.  He denied continuous anxiety when alone and panic attacks.  He denied depression.    Ictal semiology-history:    The patient re-confirmed previously presented history in detail today. He again noted in conjunction with history on the medical record that he has had grand mal seizures as the only seizure type in his lifetime.    Generalized tonic clinic seizures began in approximately .  He had perhaps 10-20 total GTC seizures, not occurring more than once per day, with the last seizure in .  He did not have an aura or consistent warning of seizure onset, except rarely he had word-finding difficulty just at  seizure onset.  Generally he had unexpected low of consciousness, finding himself down and confused later, often with a bitten tongue but not with incontinence.  Seizures were witnessed by multiple observers who reported falling and brief generalized convulsions, followed by postictal confusion for minutes to hours.      The patient denied that he has ever had a paroxysmal episode of unresponsive staring without falling, non-convulsive falling with unconsciousness, repetitive involuntary movements or posturing, sudden brief confusion, or other paroxysmal phenomena than the grand mal seizures.  He denied any history of sudden involuntary jerks while fully awake, but he has had hypnic jerks.       The patient recalls considerable association of missed AED doses with seizure occurrence, before , but otherwise he does not recognize exacerbating or remitting factors with regard to seizure frequency.    Epilepsy-seizure predispositions:   The patient has no family history of epilepsy or seizures.  He has no history of gestational or  injury, febrile convulsions, developmental delay, stroke, meningitis, encephalitis, significant head injury, or other epileptic predispositions.  He denied a history of physical or sexual abuse by an adult during his childhood or adulthood.      Laboratory evaluations:   He reported that he was told that a brain MRI was normal and an EEG was abnormal (during in-patient VEEG), longer than 10 years ago, when he was living in [place].  He requested transfer of medical records several times, but records were not received at this clinic.      Epilepsy therapeutics:   He was initially treated with carbamazepine, but had hives.   He was then on lamotrigine, but seizures were not controlled.    He then went on to levetiracetam monotherapy but due to compliance and dosing issues, continued to have 1-2 GTCs/year, until he was consistently taking levetiracetam and became seizure free since  2014.  He denied adverse effects of levetiracetam.  A levetiracetam level was 42 mccg/ml on 11/21/2018.      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   2)  Probable migraine headaches.      PERSONAL AND SOCIAL HISTORY:  He grew up in [place].  He completed a baccalaureate degree.  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  He lives with his wife.  He has 2-3 drinks of alcohol once every two weeks.  He denies tobacco or recreational drug use. He     REVIEW OF SYSTEMS:  The patient denied history of weakness, tremors or other abnormal involuntary movements, numbness or tingling, incoordination, falling or difficulty in walking, urinary or fecal incontinence, headache and other pain, except as described above.  The patient denied any history of depression, suicidal ideation, continuous anxiety, panic attacks, hallucinations, delusions, psychosis, substance abuse, or psychiatric hospitalization.  He denied rashes and easy bruising.  The remainder of a 10-system symptom review was negative.    MEDICATIONS:  Levetiracetam 2000 mg b.i.d., and other medications as per the electronic medical record.      PHYSICAL EXAMINATION:    The patient was alert and in no apparent distress.  Vital signs were as per the electronic medical record.  Skull was normocephalic and atraumatic.  Neck was supple, without signs of meningeal irritation.       On neurological examination the patient appeared alert and was fully oriented to person, place, time, and reason for visit.  Speech showed normal articulation, fluency, repetitions, naming, syntax and comprehension.  Cranial nerves III through XII were normal.  Muscle masses, tones and strengths were normal throughout.  There was no pronator drift.  Sequential fine finger movements were performed normally with each hand.  No spontaneous tremors,  myoclonus, or other abnormal movements were observed.  Sensations of light touch, pin prick, vibration and proprioception were reportedly normal throughout.  The rapid alternating movements, and finger-nose-finger and heel-shin maneuvers were performed normally bilaterally.  Romberg maneuver was negative.  Regular, heel, toe, tandem and reverse tandem walking were normal.  Deep tendon reflexes were normal and symmetric throughout.  Toes were downgoing bilaterally.      IMPRESSION:    The patient has had no seizures since 2014.  He denied adverse effects of levetiracetam.   We will continue the current dose.  He did agree to complete an out-patient EEG, as we never received outside records of EEGs that may have been abnormal.    He now is having severe headaches intermittently.  We will obtain a brain MRI to screen for cephalogenic lesions, although he may have common migraine headaches.  He declined to start sumatriptan or other anti-migraine therapy today, but he will consider this in the future after a brain MRI has been completed.    He has begun to experience situational anxiety that is quite bothersome.  Possibly he might have social phobia, but it would be best for him to have psychiatric evaluation to exclude CODY another pathologies.  He agreed with referral for behavioral health screening.    I reviewed [place] regulations on seizures and driving with the patient.  He appeared to clearly understand that he is prohibited from operating a motor vehicle within 3 months following any seizure or other episode with sudden unconsciousness or inability to sit up, and that he is required to report any future such seizure to the [facility] within 30 days after the event.      PLAN:    1.  Continue levetiracetam at the current dose.    2.  Brain 3T MRI.     3.  Out-patient 3hr VEEG.  .   4.  Referral to Adult Mental Health [facility].    5.  Return Video visit in approximately 3 months.     I spent 52 minutes in this patient  care, with 40minutes in direct patient contact, and 12 minutes in chart review and document preparation on the day of the visit.       Samuel oCdy M.D.   Professor of Neurology

## 2023-05-21 DIAGNOSIS — G40.309 EPILEPSY, GENERALIZED, CONVULSIVE (H): ICD-10-CM

## 2023-05-24 RX ORDER — LEVETIRACETAM 1000 MG/1
1000 TABLET ORAL DAILY
Qty: 120 TABLET | Refills: 0 | Status: SHIPPED | OUTPATIENT
Start: 2023-05-24 | End: 2023-07-17

## 2023-06-04 ENCOUNTER — HEALTH MAINTENANCE LETTER (OUTPATIENT)
Age: 33
End: 2023-06-04

## 2023-07-17 ENCOUNTER — MYC REFILL (OUTPATIENT)
Dept: NEUROLOGY | Facility: CLINIC | Age: 33
End: 2023-07-17

## 2023-07-17 DIAGNOSIS — G40.309 EPILEPSY, GENERALIZED, CONVULSIVE (H): ICD-10-CM

## 2023-07-17 RX ORDER — LEVETIRACETAM 1000 MG/1
2000 TABLET ORAL 2 TIMES DAILY
Qty: 120 TABLET | Refills: 5 | Status: SHIPPED | OUTPATIENT
Start: 2023-07-17 | End: 2024-01-13

## 2023-07-17 NOTE — TELEPHONE ENCOUNTER
levETIRAcetam (KEPPRA) 1000 MG tablet  Last Written Prescription Date:  5/24/2023  Last Fill Quantity: 120,   # refills: 0  Last Office Visit :  3/1/2023  Future Office visit:  None    Routing refill request to provider for review/approval because:  Only 120 days sent to pharm last order.  Okay to sent a 90 day supply with refills to pharm for a whole year.  Please review and send new order.   Thank you       Bel Allen RN  Central Triage Red Flags/Med Refills

## 2023-07-17 NOTE — TELEPHONE ENCOUNTER
[facility] Call [facility]    Phone Message    May a detailed message be left on voicemail: yes     Reason for Call: Medication Refill Request    Has the patient contacted the pharmacy for the refill? Yes   Name of medication being requested: levETIRAcetam (KEPPRA) 1000 MG tablet  Provider who prescribed the medication: Samuel Cody  Pharmacy: [facility] [address] [place] [address]  Date medication is needed: ASAP     Pt is requesting a refill for the medication listed above. Pt has a couple days left.     Please call back to advise at # [phone].      Action Taken: Message routed to:  Other: [facility]    Travel Screening: Not Applicable

## 2023-07-20 RX ORDER — LEVETIRACETAM 1000 MG/1
1000 TABLET ORAL DAILY
Qty: 90 TABLET | OUTPATIENT
Start: 2023-07-20

## 2023-08-21 DIAGNOSIS — G40.309 EPILEPSY, GENERALIZED, CONVULSIVE (H): ICD-10-CM

## 2023-08-24 RX ORDER — LEVETIRACETAM 1000 MG/1
1000 TABLET ORAL DAILY
Qty: 120 TABLET | Refills: 0 | OUTPATIENT
Start: 2023-08-24

## 2024-01-11 DIAGNOSIS — G40.309 EPILEPSY, GENERALIZED, CONVULSIVE (H): ICD-10-CM

## 2024-01-13 ENCOUNTER — MYC REFILL (OUTPATIENT)
Dept: NEUROLOGY | Facility: CLINIC | Age: 34
End: 2024-01-13

## 2024-01-13 DIAGNOSIS — G40.309 EPILEPSY, GENERALIZED, CONVULSIVE (H): ICD-10-CM

## 2024-01-15 RX ORDER — LEVETIRACETAM 1000 MG/1
2000 TABLET ORAL 2 TIMES DAILY
Qty: 120 TABLET | Refills: 1 | Status: SHIPPED | OUTPATIENT
Start: 2024-01-15 | End: 2024-03-10

## 2024-01-15 NOTE — TELEPHONE ENCOUNTER
Last seen: 3/1/23  RTC: 3 months  Cancel: yes  No-show: no  Next appt: needs to be scheduled - will reach out to patient to schedule    Incoming refill from patient via my chart    Medication requested: levETIRAcetam (KEPPRA) 1000 MG tablet   Directions: Route: Take 2 tablets (2,000 mg) by mouth 2 times daily   Qty: 120  Last refilled: 7/17/23    Medication refill approved per refill protocol

## 2024-01-16 RX ORDER — LEVETIRACETAM 1000 MG/1
2000 TABLET ORAL 2 TIMES DAILY
Qty: 120 TABLET | Refills: 5 | OUTPATIENT
Start: 2024-01-16

## 2024-01-16 NOTE — TELEPHONE ENCOUNTER
LEVETIRACETAM 1000MG TABS refill request refused,duplicate,ordered #120,1 refill on 1/15/24.Called to pharmacy

## 2024-01-31 ENCOUNTER — HOSPITAL ENCOUNTER (EMERGENCY)
Facility: CLINIC | Age: 34
Discharge: HOME | End: 2024-01-31
Attending: STUDENT IN AN ORGANIZED HEALTH CARE EDUCATION/TRAINING PROGRAM | Admitting: STUDENT IN AN ORGANIZED HEALTH CARE EDUCATION/TRAINING PROGRAM
Payer: COMMERCIAL

## 2024-01-31 ENCOUNTER — APPOINTMENT (OUTPATIENT)
Dept: GENERAL RADIOLOGY | Facility: CLINIC | Age: 34
End: 2024-01-31
Attending: STUDENT IN AN ORGANIZED HEALTH CARE EDUCATION/TRAINING PROGRAM
Payer: COMMERCIAL

## 2024-01-31 VITALS
HEART RATE: 79 BPM | DIASTOLIC BLOOD PRESSURE: 62 MMHG | OXYGEN SATURATION: 97 % | SYSTOLIC BLOOD PRESSURE: 122 MMHG | HEIGHT: 75 IN | RESPIRATION RATE: 18 BRPM | BODY MASS INDEX: 27.63 KG/M2 | WEIGHT: 222.2 LBS | TEMPERATURE: 97.8 F

## 2024-01-31 DIAGNOSIS — S42.301A CLOSED FRACTURE OF SHAFT OF RIGHT HUMERUS, UNSPECIFIED FRACTURE MORPHOLOGY, INITIAL ENCOUNTER: ICD-10-CM

## 2024-01-31 PROCEDURE — 250N000011 HC RX IP 250 OP 636: Performed by: STUDENT IN AN ORGANIZED HEALTH CARE EDUCATION/TRAINING PROGRAM

## 2024-01-31 PROCEDURE — 99284 EMERGENCY DEPT VISIT MOD MDM: CPT | Mod: 25

## 2024-01-31 PROCEDURE — 73060 X-RAY EXAM OF HUMERUS: CPT | Mod: RT

## 2024-01-31 PROCEDURE — 96374 THER/PROPH/DIAG INJ IV PUSH: CPT

## 2024-01-31 PROCEDURE — 250N000013 HC RX MED GY IP 250 OP 250 PS 637: Performed by: STUDENT IN AN ORGANIZED HEALTH CARE EDUCATION/TRAINING PROGRAM

## 2024-01-31 PROCEDURE — 96372 THER/PROPH/DIAG INJ SC/IM: CPT | Mod: XS | Performed by: STUDENT IN AN ORGANIZED HEALTH CARE EDUCATION/TRAINING PROGRAM

## 2024-01-31 PROCEDURE — 24500 CLTX HUMRL SHFT FX W/O MNPJ: CPT | Mod: RT

## 2024-01-31 RX ORDER — HYDROMORPHONE HYDROCHLORIDE 1 MG/ML
1 INJECTION, SOLUTION INTRAMUSCULAR; INTRAVENOUS; SUBCUTANEOUS ONCE
Status: COMPLETED | OUTPATIENT
Start: 2024-01-31 | End: 2024-01-31

## 2024-01-31 RX ORDER — OXYCODONE AND ACETAMINOPHEN 5; 325 MG/1; MG/1
1 TABLET ORAL EVERY 6 HOURS PRN
Qty: 12 TABLET | Refills: 0 | Status: SHIPPED | OUTPATIENT
Start: 2024-01-31 | End: 2024-02-03

## 2024-01-31 RX ORDER — IBUPROFEN 600 MG/1
600 TABLET, FILM COATED ORAL ONCE
Status: COMPLETED | OUTPATIENT
Start: 2024-01-31 | End: 2024-01-31

## 2024-01-31 RX ORDER — ACETAMINOPHEN 500 MG
1000 TABLET ORAL ONCE
Status: COMPLETED | OUTPATIENT
Start: 2024-01-31 | End: 2024-01-31

## 2024-01-31 RX ORDER — OXYCODONE HYDROCHLORIDE 5 MG/1
10 TABLET ORAL ONCE
Status: COMPLETED | OUTPATIENT
Start: 2024-01-31 | End: 2024-01-31

## 2024-01-31 RX ADMIN — HYDROMORPHONE HYDROCHLORIDE 1 MG: 1 INJECTION, SOLUTION INTRAMUSCULAR; INTRAVENOUS; SUBCUTANEOUS at 23:17

## 2024-01-31 RX ADMIN — IBUPROFEN 600 MG: 600 TABLET ORAL at 20:37

## 2024-01-31 RX ADMIN — ACETAMINOPHEN 1000 MG: 500 TABLET, FILM COATED ORAL at 20:37

## 2024-01-31 RX ADMIN — OXYCODONE HYDROCHLORIDE 10 MG: 5 TABLET ORAL at 21:45

## 2024-01-31 ASSESSMENT — ACTIVITIES OF DAILY LIVING (ADL)
ADLS_ACUITY_SCORE: 33
ADLS_ACUITY_SCORE: 35

## 2024-02-01 NOTE — ED PROVIDER NOTES
ED ATTENDING PHYSICIAN NOTE:   I evaluated this patient in conjunction with Becky Westbrook PA-C  I have participated in the care of the patient and personally performed key elements of the history, exam, and medical decision making.      HPI:   Jatin Norris is a 33 year old male who presents with right arm pain. He was throwing a dodge ball when he felt and heard a crack in the right upper arm.  He is right hand dominant.  He does not have any numbness or tingling.  He is otherwise healthy.  No previous broken bones.N    Independent Historian:   None - Patient Only    Review of External Notes:None     EXAM:     Physical Exam   Constitutional:  Patient is oriented to person, place, and time. They appear well-developed and well-nourished. Mild distress secondary to right arm pain  HENT:      Eyes:    Conjunctivae normal and EOM are normal. Pupils are equal, round, and reactive to light.   Neck:    Normal range of motion.   Musculoskeletal:  Edema of the right arm.  No open wounds.  No ecchymosis or erythema.  Limited range of motion of his arm secondary to pain.  Normal radial pulse normal sensation distal to the injury.  No pain at the shoulder..   Neurological:   Patient is alert and oriented to person, place, and time. Patient has normal strength. No cranial nerve deficit or sensory deficit. GCS 15  Skin:   Skin is warm and dry. No rash noted. No erythema.   Psychiatric:   Patient has a normal mood and affect. Patient's behavior is normal. Judgment and thought content normal.       Independent Interpretation (X-rays, CTs, rhythm strip):  Personally viewed the images agree with the oblique nondisplaced humerus fracture    Consultations/Discussion of Management or Tests:  None     Social Determinants of Health affecting care:   None     MEDICAL DECISION MAKING/ASSESSMENT AND PLAN:   Jatin Norris is a 33-year-old female presenting with a right arm injury after he threw fall.  He does have a oblique  fracture of the right humerus.  It is nondisplaced.  He has no signs of compartment syndrome.  He has normal pulse normal sensation distal to this injury.  This is acute obliterative unusual fracture given the mechanism.  He was placed in a splint.  He was provided pain medication.  He will be [facility] to [facility]opedic.  Splint care was discussed.        DIAGNOSIS:     ICD-10-CM    1. Closed fracture of shaft of right humerus, unspecified fracture morphology, initial encounter  S42.301A                DISPOSITION:   Discharge     Scribe Disclosure:  I, Jessica Lea, am serving as a scribe at 9:37 PM on 1/31/2024 to document services personally performed by Becky Westbrook PA-C based on my observations and the provider's statements to me.   1/31/2024  Becky Westbrook PA-C  [facility] EMERGENCY DEPT       [facility], Mitzi Santos MD  01/31/24 9619

## 2024-02-01 NOTE — ED PROVIDER NOTES
"  History     Chief Complaint:  Arm Injury     HPI   Jatin Norris is a 33 year old male who presents to the ED with complaints of right arm pain.  He was throwing a dodgeball approximately an hour ago when he felt and heard a crack in his upper arm.  He had immediate pain.  His wife reports that she heard the noise and across to the gym.  He was throwing the ball quite hard.  He denies other injuries.  He takes Keppra for seizures but is otherwise healthy.  Denies numbness or tingling in the hand.    Independent Historian:   Spouse/Partner - They report supplemental information.    Review of External Notes:   None     Medications:    levETIRAcetam (KEPPRA) 1000 MG tablet        Past Medical History:    No past medical history on file.    Past Surgical History:    No past surgical history on file.     Physical Exam   Patient Vitals for the past 24 hrs:   BP Temp Pulse Resp SpO2 Height Weight   01/31/24 2032 105/51 -- -- -- -- -- --   01/31/24 2030 -- 97.8  F (36.6  C) 88 18 94 % 1.905 m (6' 3\") 100.8 kg (222 lb 3.2 oz)      Physical Exam  Vital signs and nursing notes reviewed.    General:  Patient in no acute distress. Sitting on bed with wife at bedside.   Head:  No obvious trauma to head. Negative ramírez's sign and negative raccoon eyes bilaterally.  Ears: External ears normal. No hemotympanum bilaterally.  Nose:  No deformity to nose. No epistaxis.   Eyes:  Conjunctivae and EOM are normal. Pupils are equal, round, and reactive.   Neck: Normal range of motion. Neck supple. No tracheal deviation present. No midline cervical neck tenderness, deformity, step off or pain in the midline with ROM.  CV:  Normal heart sounds. 2+ radial pulses bilaterally.  Pulm/Chest: Breath sounds clear and equal. Effort normal.   M/S: Swelling of right upper arm, painful to palpation.  Held in flexion by other arm.  Shoulder, elbow, forearm, wrist, and hand are without pain to palpation or deformity.    Neuro: Alert. GCS " 15.  Skin: Skin is warm and dry to touch. No lesions noted. Not diaphoretic.   Psych: Normal mood and affect. Behavior is normal.      Emergency Department Course   Imaging:  Humerus XR, G/E 2 views, right   Final Result   IMPRESSION: There is an acute appearing, obliquely oriented, mildly displaced fracture of the mid right humeral shaft.         Laboratory:  Labs Ordered and Resulted from Time of ED Arrival to Time of ED Departure - No data to display     Procedures     Splint Placement     Procedure: Splint Placement     Indication: Fracture    Consent: Verbal     Location: Right Arm    Preparation: n/a    Procedure detail:   Splint was applied by Tech/Nurse with my assistance  Splint type: Long-arm posterior   Splint materilal: Fiberglass  After placement I checked and adjusted the fit as needed to ensure proper positioning/fit  Sensation and circulation are intact after splint placement     Patient Status: The patient tolerated the procedure well: Yes. There were no complications.      Emergency Department Course & Assessments:         Interventions:  Medications   HYDROmorphone (PF) (DILAUDID) injection 1 mg (has no administration in time range)   ibuprofen (ADVIL/MOTRIN) tablet 600 mg (600 mg Oral $Given 1/31/24 2037)   acetaminophen (TYLENOL) tablet 1,000 mg (1,000 mg Oral $Given 1/31/24 2037)   oxyCODONE (ROXICODONE) tablet 10 mg (10 mg Oral $Given 1/31/24 2145)        Independent Interpretation (X-rays, CTs, rhythm strip):  I appreciated an obvious fracture on the humerus x-ray    Consultations/Discussion of Management or Tests:  None     Assessments:  ED Course as of 01/31/24 2319 Wed Jan 31, 2024 2123 I initially assessed the patient and obtained the above history and physical exam.     2150 Dr. Desai assessed the patient.    2230 Placed splint.   2300 Dr. Desai reassessed the patient.   2316 I reassessed the patient and updated them on results and plan of care.  Discussed discharge instructions.   We will observe him after Dilaudid to ensure he feels well to go home.       Social Determinants of Health affecting care:   None    Disposition:  The patient was discharged.     Impression & Plan    CMS Diagnoses: None    Medical Decision Making:  Jatin Norris is a 33 year old male who presents for evaluation of arm injury from dodgeball earlier this evening.  See HPI.  Vital signs normal.  On exam, patient is quite tender and swollen in his right upper arm.  Shoulder and elbow and lower arm and wrist are totally normal without pain to palpation or deformity.  Distal pulses are intact and distal sensation is normal.  X-ray confirms midshaft humerus fracture.  Posterior long-arm splint was placed as outlined above and patient needs to follow-up with orthopedics tomorrow for clinic assessment and definitive management.  Pain controlled with interventions in the ED.  He is neurovascularly intact and has no other injuries from event earlier.  Using reasonable clinical judgment, I feel the patient is safe for discharge home at this time.  Will plan on ibuprofen for pain, and prescription pain medicine as needed for severe pain.  Instructed to call [facility] and schedule follow-up appointment as soon as possible and return to the ED should he develop fevers, numbness or tingling in the hand, worsening pain, or any further concerns.    Diagnosis:    ICD-10-CM    1. Closed fracture of shaft of right humerus, unspecified fracture morphology, initial encounter  S42.301A          Discharge Medications:  New Prescriptions    OXYCODONE-ACETAMINOPHEN (PERCOCET) 5-325 MG TABLET    Take 1 tablet by mouth every 6 hours as needed for pain        Becky Westbrook PA-C on 1/31/2024 at 11:19 PM         Becky Westbrook PA-C  01/31/24 2311

## 2024-02-01 NOTE — ED TRIAGE NOTES
[facility]  ED Arrival Note    Arrives through triage. ABC's intact. A &O X4. . Pt arrives with c/o R arm pain at the elbow site after throwing a ball while exercising. ROM is limited r/t pain. Distal perfusion appears intact.       Visitors during triage: Spouse      Triage Interventions: X-ray    Ambulatory: Yes    Meets Stroke Criteria?: No    Meets Trauma Criteria?: No    Shock Index: <0.8, for provider reference    Directed to: Triage Lobby    Pronouns: he/him       Triage Assessment (Adult)       Row Name 01/31/24 2030          Triage Assessment    Airway WDL WDL        Respiratory WDL    Respiratory WDL WDL        Skin Circulation/Temperature WDL    Skin Circulation/Temperature WDL WDL        Cardiac WDL    Cardiac WDL WDL        Peripheral/Neurovascular WDL    Peripheral Neurovascular WDL WDL        Cognitive/Neuro/Behavioral WDL    Cognitive/Neuro/Behavioral WDL WDL

## 2024-02-01 NOTE — DISCHARGE INSTRUCTIONS
Call [facility] orthopedics tomorrow to schedule an appointment as soon as possible for further evaluation and definitive management of your fracture.  Return to the ED with numbness or tingling, fevers, or severe pain.  Take 600 mg of ibuprofen every 6-8 hours for pain.  Do not exceed 3200 mg in a 24-hour period.  Take oxycodone/acetaminophen as needed for severe pain.  Do not drink alcohol, drive, operate machinery, or swim while on this medication.  Opioid Medication Information    You have been given a prescription for an opioid (narcotic) pain medicine and/or have received a pain medicine while here in the Emergency Department. These medicines can make you drowsy or impaired. You must not drive, operate dangerous equipment, or engage in any other dangerous activities while taking these medications. If you drive while taking these medications, you could be arrested for driving under the influence (DUI). Do not drink any alcohol while you are taking these medications.     Opioid pain medications can cause addiction. If you have a history of chemical dependency of any type, you are at a higher risk of becoming addicted to pain medications.  Only take these prescribed medications to treat your pain when all other options have been tried. Take it for as short a time and as few doses as possible. Store your pain pills in a secure place, as they are frequently stolen and provide a dangerous opportunity for children or visitors in your house to start abusing these powerful medications. We will not replace any lost or stolen medicine.    If you do not finish your medication, it is a good idea to get rid of it but please do not flush it down the toilet. Please dispose of the remaining medication at a local pharmacy or law enforcement facility. The [place] [employer]ution [employer] has additional information on medication disposal: https://www.pca.[facility].[place].us/living-green/managing-unwanted-medications.      Many  prescription pain medications contain Tylenol  (acetaminophen), including Vicodin , Tylenol #3 , Norco , Lortab , and Percocet .  You should not take any extra pills of Tylenol  if you are using these prescription medications or you can get very sick.  Do not ever take more than 3000 mg of acetaminophen in any 24 hour period.    All opioids tend to cause constipation. Drink plenty of water and eat foods that have a lot of fiber, such as fruits, vegetables, prune juice, apple juice and high fiber cereal.  Take a laxative if you don t move your bowels at least every other day. Miralax , Milk of Magnesia, Colace , or Senna  can be used to keep you regular.

## 2024-03-07 DIAGNOSIS — G40.309 EPILEPSY, GENERALIZED, CONVULSIVE (H): ICD-10-CM

## 2024-03-10 ENCOUNTER — MYC REFILL (OUTPATIENT)
Dept: NEUROLOGY | Facility: CLINIC | Age: 34
End: 2024-03-10

## 2024-03-10 DIAGNOSIS — G40.309 EPILEPSY, GENERALIZED, CONVULSIVE (H): ICD-10-CM

## 2024-03-11 RX ORDER — LEVETIRACETAM 1000 MG/1
2000 TABLET ORAL 2 TIMES DAILY
Qty: 120 TABLET | Refills: 1 | Status: SHIPPED | OUTPATIENT
Start: 2024-03-11 | End: 2024-05-13

## 2024-03-14 RX ORDER — LEVETIRACETAM 1000 MG/1
2000 TABLET ORAL 2 TIMES DAILY
Qty: 120 TABLET | Refills: 1 | OUTPATIENT
Start: 2024-03-14

## 2024-03-27 DIAGNOSIS — G40.309 EPILEPSY, GENERALIZED, CONVULSIVE (H): ICD-10-CM

## 2024-03-27 NOTE — TELEPHONE ENCOUNTER
What is the concern that needs to be addressed by a nurse? Pt will be out of medication by his next appt. He would need a refill that will last until his next appt.     May a detailed message be left on voicemail? Yes     Date of last office visit: 6-7-23    Message routed to: Refill Team

## 2024-04-04 RX ORDER — LEVETIRACETAM 1000 MG/1
2000 TABLET ORAL 2 TIMES DAILY
Qty: 120 TABLET | Refills: 1 | OUTPATIENT
Start: 2024-04-04

## 2024-05-05 DIAGNOSIS — G40.309 EPILEPSY, GENERALIZED, CONVULSIVE (H): ICD-10-CM

## 2024-05-13 ENCOUNTER — MYC REFILL (OUTPATIENT)
Dept: NEUROLOGY | Facility: CLINIC | Age: 34
End: 2024-05-13

## 2024-05-13 DIAGNOSIS — G40.309 EPILEPSY, GENERALIZED, CONVULSIVE (H): ICD-10-CM

## 2024-05-14 RX ORDER — LEVETIRACETAM 1000 MG/1
2000 TABLET ORAL 2 TIMES DAILY
Qty: 120 TABLET | Refills: 1 | Status: SHIPPED | OUTPATIENT
Start: 2024-05-14 | End: 2024-06-19

## 2024-05-14 NOTE — TELEPHONE ENCOUNTER
Last seen: 3/1/23  RTC: 3 months  Cancel: yes  No-show: no  Next appt: 6/19/24    Incoming refill from patient via my chart    Medication requested: levETIRAcetam (KEPPRA) 1000 MG tablet   Directions: Take 2 tablets (2,000 mg) by mouth 2 times daily   Qty: 120 + 1 refill  Last refilled: 3/11/24    Medication refill approved per refill protocol

## 2024-05-15 RX ORDER — LEVETIRACETAM 1000 MG/1
2000 TABLET ORAL 2 TIMES DAILY
Qty: 120 TABLET | Refills: 1 | OUTPATIENT
Start: 2024-05-15

## 2024-06-19 ENCOUNTER — OFFICE VISIT (OUTPATIENT)
Dept: NEUROLOGY | Facility: CLINIC | Age: 34
End: 2024-06-19
Payer: COMMERCIAL

## 2024-06-19 VITALS
HEART RATE: 65 BPM | DIASTOLIC BLOOD PRESSURE: 72 MMHG | BODY MASS INDEX: 28.25 KG/M2 | HEIGHT: 75 IN | SYSTOLIC BLOOD PRESSURE: 116 MMHG | TEMPERATURE: 98.4 F | WEIGHT: 227.2 LBS | OXYGEN SATURATION: 98 %

## 2024-06-19 DIAGNOSIS — G40.309 EPILEPSY, GENERALIZED, CONVULSIVE (H): Primary | ICD-10-CM

## 2024-06-19 RX ORDER — LEVETIRACETAM 1000 MG/1
2000 TABLET ORAL 2 TIMES DAILY
Qty: 360 TABLET | Refills: 3 | Status: SHIPPED | OUTPATIENT
Start: 2024-06-19

## 2024-06-19 NOTE — LETTER
2024       RE: Jatin Norris  : 1990   MRN: [id number]      Dear Colleague,    Thank you for referring your patient, Jatin Norris, to the [facility] EPILEPSY [facility] at [facility]. Please see a copy of my visit note below.      [facility]:  RETURN VISIT           Service Date: 2024     HISTORY:  I saw Mr. 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  He came alone for the visit today.       Following the most recent visit on 2023, the patient has had no seizures and no medication adverse effects.   The most recent grand mal seizure occurred in     He has had headaches in the past, but these have been more severe over the last year.  On 4 occasions in , he had no aura prior to progressive, severe throbbing left frontal pain, associated with photophobia and phonophobia, not responding to acetaminophen or ibuprofen, which lasted 1-2 hours until in each case he was able to fall asleep, with no headache after sleeping.       Over the last year, he has had increasing anxiety, which mainly is triggered by preparation for and speaking to business colleagues or others, and by exposure to groups of more than a few people.  He denied continuous anxiety when alone and panic attacks.  He denied depression.     Ictal semiology-history:             The patient re-confirmed previously presented history in detail today. He again noted in conjunction with history on the medical record that he has had grand mal seizures as the only seizure type in his lifetime.     Generalized tonic clinic seizures began in approximately .  He had perhaps 10-20 total GTC seizures, not occurring more than once per day, with the last seizure in .  He did not have an aura or consistent warning of seizure onset, except rarely he had word-finding  difficulty just at seizure onset.  Generally he had unexpected low of consciousness, finding himself down and confused later, often with a bitten tongue but not with incontinence.  Seizures were witnessed by multiple observers who reported falling and brief generalized convulsions, followed by postictal confusion for minutes to hours.       The patient denied that he has ever had a paroxysmal episode of unresponsive staring without falling, non-convulsive falling with unconsciousness, repetitive involuntary movements or posturing, sudden brief confusion, or other paroxysmal phenomena than the grand mal seizures.  He denied any history of sudden involuntary jerks while fully awake, but he has had hypnic jerks.       The patient recalls considerable association of missed AED doses with seizure occurrence, before , but otherwise he does not recognize exacerbating or remitting factors with regard to seizure frequency.     Epilepsy-seizure predispositions:   The patient has no family history of epilepsy or seizures.  He has no history of gestational or  injury, febrile convulsions, developmental delay, stroke, meningitis, encephalitis, significant head injury, or other epileptic predispositions.  He denied a history of physical or sexual abuse by an adult during his childhood or adulthood.       Laboratory evaluations:   He reported that he was told that a brain MRI was normal and an EEG was abnormal (during in-patient VEEG), longer than 10 years ago, when he was living in [place].  He requested transfer of medical records several times, but records were not received at this clinic.       Epilepsy therapeutics:   He was initially treated with carbamazepine, but had hives.   He was then on lamotrigine, but seizures were not controlled.     He then went on to levetiracetam monotherapy but due to compliance and dosing issues, continued to have 1-2 GTCs/year, until he was consistently taking levetiracetam and  became seizure free since 2014.  He denied adverse effects of levetiracetam.  A levetiracetam level was 42 mccg/ml on 11/21/2018.       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   2)  Probable migraine headaches.      PERSONAL AND SOCIAL HISTORY:  He grew up in [place].  He completed a baccalaureate degree.  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  He lives with his wife.  He has 2-3 drinks of alcohol once every two weeks.  He denies tobacco or recreational drug use. He      REVIEW OF SYSTEMS:  The patient denied history of weakness, tremors or other abnormal involuntary movements, numbness or tingling, incoordination, falling or difficulty in walking, urinary or fecal incontinence, headache and other pain, except as described above.  The patient denied any history of depression, suicidal ideation, continuous anxiety, panic attacks, hallucinations, delusions, psychosis, substance abuse, or psychiatric hospitalization.  He denied rashes and easy bruising.  The remainder of a 10-system symptom review was negative.     MEDICATIONS:  Levetiracetam 2000 mg b.i.d., and other medications as per the electronic medical record.      PHYSICAL EXAMINATION:    The patient was alert and in no apparent distress.  Vital signs were as per the electronic medical record.  Skull was normocephalic and atraumatic.  Neck was supple, without signs of meningeal irritation.                                             On neurological examination the patient appeared alert and was fully oriented to person, place, time, and reason for visit.  Speech showed normal articulation, fluency, repetitions, naming, syntax and comprehension.  Cranial nerves III through XII were normal.  Muscle masses, tones and strengths were normal throughout.  There was no pronator drift.  Sequential fine finger movements were  performed normally with each hand.  No spontaneous tremors, myoclonus, or other abnormal movements were observed.  Sensations of light touch, pin prick, vibration and proprioception were reportedly normal throughout.  The rapid alternating movements, and finger-nose-finger and heel-shin maneuvers were performed normally bilaterally.  Romberg maneuver was negative.  Regular, heel, toe, tandem and reverse tandem walking were normal.  Deep tendon reflexes were normal and symmetric throughout.  Toes were downgoing bilaterally.       IMPRESSION:    The patient has had no seizures since 2014.  He denied adverse effects of levetiracetam.   We will continue the current dose.  He did agree to complete an out-patient EEG, as we never received outside records of EEGs that may have been abnormal.     He now is having severe headaches intermittently.  We will obtain a brain MRI to screen for cephalogenic lesions, although he may have common migraine headaches.  He declined to start sumatriptan or other anti-migraine therapy today, but he will consider this in the future after a brain MRI has been completed.     He has begun to experience situational anxiety that is quite bothersome.  Possibly he might have social phobia, but it would be best for him to have psychiatric evaluation to exclude CODY another pathologies.  He agreed with referral for behavioral health screening.     I reviewed [place] regulations on seizures and driving with the patient.  He appeared to clearly understand that he is prohibited from operating a motor vehicle within 3 months following any seizure or other episode with sudden unconsciousness or inability to sit up, and that he is required to [facility] any future [facility] within 30 days after the event.       PLAN:    1.  Continue levetiracetam at the current dose.    2.  Obtain levetiracetam level.     3.  Out-patient 3hr VEEG.  .   4.  Return visit in approximately 4 months.      I spent 26  minutes in this patient care, with 19 minutes in direct patient contact, and 7 minutes in chart review and document preparation on the day of the visit.             Again, thank you for allowing me to participate in the care of your patient.      Sincerely,    Samuel Cody MD

## 2024-06-19 NOTE — PROGRESS NOTES
[facility]:  RETURN VISIT           Service Date: 06/19/2024     HISTORY:  I saw Mr. 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  He came alone for the visit today.       Following the most recent visit on 03/01/2023, the patient has had no seizures and no medication adverse effects.   The most recent grand mal seizure occurred in 2014    He has had headaches in the past, but these have been more severe over the last year.  On 4 occasions in 2022, he had no aura prior to progressive, severe throbbing left frontal pain, associated with photophobia and phonophobia, not responding to acetaminophen or ibuprofen, which lasted 1-2 hours until in each case he was able to fall asleep, with no headache after sleeping.       Over the last year, he has had increasing anxiety, which mainly is triggered by preparation for and speaking to business colleagues or others, and by exposure to groups of more than a few people.  He denied continuous anxiety when alone and panic attacks.  He denied depression.     Ictal semiology-history:             The patient re-confirmed previously presented history in detail today. He again noted in conjunction with history on the medical record that he has had grand mal seizures as the only seizure type in his lifetime.     Generalized tonic clinic seizures began in approximately 2005.  He had perhaps 10-20 total GTC seizures, not occurring more than once per day, with the last seizure in 2014.  He did not have an aura or consistent warning of seizure onset, except rarely he had word-finding difficulty just at seizure onset.  Generally he had unexpected low of consciousness, finding himself down and confused later, often with a bitten tongue but not with incontinence.  Seizures were witnessed by multiple observers who reported falling and brief generalized convulsions, followed by  postictal confusion for minutes to hours.       The patient denied that he has ever had a paroxysmal episode of unresponsive staring without falling, non-convulsive falling with unconsciousness, repetitive involuntary movements or posturing, sudden brief confusion, or other paroxysmal phenomena than the grand mal seizures.  He denied any history of sudden involuntary jerks while fully awake, but he has had hypnic jerks.       The patient recalls considerable association of missed AED doses with seizure occurrence, before , but otherwise he does not recognize exacerbating or remitting factors with regard to seizure frequency.     Epilepsy-seizure predispositions:   The patient has no family history of epilepsy or seizures.  He has no history of gestational or  injury, febrile convulsions, developmental delay, stroke, meningitis, encephalitis, significant head injury, or other epileptic predispositions.  He denied a history of physical or sexual abuse by an adult during his childhood or adulthood.       Laboratory evaluations:   He reported that he was told that a brain MRI was normal and an EEG was abnormal (during in-patient VEEG), longer than 10 years ago, when he was living in [place].  He requested transfer of medical records several times, but records were not received at this clinic.       Epilepsy therapeutics:   He was initially treated with carbamazepine, but had hives.   He was then on lamotrigine, but seizures were not controlled.     He then went on to levetiracetam monotherapy but due to compliance and dosing issues, continued to have 1-2 GTCs/year, until he was consistently taking levetiracetam and became seizure free since .  He denied adverse effects of levetiracetam.  A levetiracetam level was 42 mccg/ml on 2018.       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   2)  Probable migraine headaches.       PERSONAL AND SOCIAL HISTORY:  He grew up in [facility].  He completed a baccalaureate degree.  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  He lives with his wife.  He has 2-3 drinks of alcohol once every two weeks.  He denies tobacco or recreational drug use. He      REVIEW OF SYSTEMS:  The patient denied history of weakness, tremors or other abnormal involuntary movements, numbness or tingling, incoordination, falling or difficulty in walking, urinary or fecal incontinence, headache and other pain, except as described above.  The patient denied any history of depression, suicidal ideation, continuous anxiety, panic attacks, hallucinations, delusions, psychosis, substance abuse, or psychiatric hospitalization.  He denied rashes and easy bruising.  The remainder of a 10-system symptom review was negative.     MEDICATIONS:  Levetiracetam 2000 mg b.i.d., and other medications as per the electronic medical record.      PHYSICAL EXAMINATION:    The patient was alert and in no apparent distress.  Vital signs were as per the electronic medical record.  Skull was normocephalic and atraumatic.  Neck was supple, without signs of meningeal irritation.                                             On neurological examination the patient appeared alert and was fully oriented to person, place, time, and reason for visit.  Speech showed normal articulation, fluency, repetitions, naming, syntax and comprehension.  Cranial nerves III through XII were normal.  Muscle masses, tones and strengths were normal throughout.  There was no pronator drift.  Sequential fine finger movements were performed normally with each hand.  No spontaneous tremors, myoclonus, or other abnormal movements were observed.  Sensations of light touch, pin prick, vibration and proprioception were reportedly normal throughout.  The rapid alternating movements, and finger-nose-finger and heel-shin  maneuvers were performed normally bilaterally.  Romberg maneuver was negative.  Regular, heel, toe, tandem and reverse tandem walking were normal.  Deep tendon reflexes were normal and symmetric throughout.  Toes were downgoing bilaterally.       IMPRESSION:    The patient has had no seizures since 2014.  He denied adverse effects of levetiracetam.   We will continue the current dose.  He did agree to complete an out-patient EEG, as we never received outside records of EEGs that may have been abnormal.     He now is having severe headaches intermittently.  We will obtain a brain MRI to screen for cephalogenic lesions, although he may have common migraine headaches.  He declined to start sumatriptan or other anti-migraine therapy today, but he will consider this in the future after a brain MRI has been completed.     He has begun to experience situational anxiety that is quite bothersome.  Possibly he might have social phobia, but it would be best for him to have psychiatric evaluation to exclude CODY another pathologies.  He agreed with referral for behavioral health screening.     I reviewed [place] regulations on seizures and driving with the patient.  He appeared to clearly understand that he is prohibited from operating a motor vehicle within 3 months following any seizure or other episode with sudden unconsciousness or inability to sit up, and that he is required to report any future such seizure to the [facility] within 30 days after the event.       PLAN:    1.  Continue levetiracetam at the current dose.    2.  Obtain levetiracetam level.     3.  Out-patient 3hr VEEG.  .   4.  Return visit in approximately 4 months.      I spent 26 minutes in this patient care, with 19 minutes in direct patient contact, and 7 minutes in chart review and document preparation on the day of the visit.         Samuel Cody M.D.   Professor of Neurology

## 2024-07-14 ENCOUNTER — HEALTH MAINTENANCE LETTER (OUTPATIENT)
Age: 34
End: 2024-07-14

## 2025-01-14 ENCOUNTER — OFFICE VISIT (OUTPATIENT)
Dept: NEUROLOGY | Facility: CLINIC | Age: 35
End: 2025-01-14
Attending: PSYCHIATRY & NEUROLOGY
Payer: COMMERCIAL

## 2025-01-14 VITALS
BODY MASS INDEX: 28.3 KG/M2 | HEIGHT: 75 IN | DIASTOLIC BLOOD PRESSURE: 87 MMHG | HEART RATE: 70 BPM | TEMPERATURE: 98.4 F | SYSTOLIC BLOOD PRESSURE: 126 MMHG | WEIGHT: 227.6 LBS

## 2025-01-14 DIAGNOSIS — G40.309 EPILEPSY, GENERALIZED, CONVULSIVE (H): ICD-10-CM

## 2025-01-14 RX ORDER — LEVETIRACETAM 1000 MG/1
2000 TABLET ORAL 2 TIMES DAILY
Qty: 360 TABLET | Refills: 3 | Status: SHIPPED | OUTPATIENT
Start: 2025-01-14

## 2025-01-14 NOTE — PROGRESS NOTES
[facility] Ep[facility]psy [facility]:  RETURN VISIT           Service Date: 01/14/2025     HISTORY:  I saw Mr. 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  He came alone for the visit today.       Following the most recent visit on 06/19/2024, the patient has had no seizures and no medication adverse effects.   The most recent grand mal seizure occurred in 2014     He has had headaches in the past, but these have been more severe over the last year.  On 4 occasions in 2022, he had no aura prior to progressive, severe throbbing left frontal pain, associated with photophobia and phonophobia, not responding to acetaminophen or ibuprofen, which lasted 1-2 hours until in each case he was able to fall asleep, with no headache after sleeping.       Over the last year, he has had increasing anxiety, which mainly is triggered by preparation for and speaking to business colleagues or others, and by exposure to groups of more than a few people.  He denied continuous anxiety when alone and panic attacks.  He denied depression.     Ictal semiology-history:             The patient re-confirmed previously presented history in detail today. He again noted in conjunction with history on the medical record that he has had grand mal seizures as the only seizure type in his lifetime.     Generalized tonic clinic seizures began in approximately 2005.  He had perhaps 10-20 total GTC seizures, not occurring more than once per day, with the last seizure in 2014.  He did not have an aura or consistent warning of seizure onset, except rarely he had word-finding difficulty just at seizure onset.  Generally he had unexpected low of consciousness, finding himself down and confused later, often with a bitten tongue but not with incontinence.  Seizures were witnessed by multiple observers who reported falling and brief generalized convulsions, followed by  postictal confusion for minutes to hours.       The patient denied that he has ever had a paroxysmal episode of unresponsive staring without falling, non-convulsive falling with unconsciousness, repetitive involuntary movements or posturing, sudden brief confusion, or other paroxysmal phenomena than the grand mal seizures.  He denied any history of sudden involuntary jerks while fully awake, but he has had hypnic jerks.       The patient recalls considerable association of missed AED doses with seizure occurrence, before , but otherwise he does not recognize exacerbating or remitting factors with regard to seizure frequency.     Epilepsy-seizure predispositions:   The patient has no family history of epilepsy or seizures.  He has no history of gestational or  injury, febrile convulsions, developmental delay, stroke, meningitis, encephalitis, significant head injury, or other epileptic predispositions.  He denied a history of physical or sexual abuse by an adult during his childhood or adulthood.       Laboratory evaluations:   He reported that he was told that a brain MRI was normal and an EEG was abnormal (during in-patient VEEG), longer than 10 years ago, when he was living in [place].  He requested transfer of medical records several times, but records were not received at this clinic.       Epilepsy therapeutics:   He was initially treated with carbamazepine, but had hives.   He was then on lamotrigine, but seizures were not controlled.     He then went on to levetiracetam monotherapy but due to compliance and dosing issues, continued to have 1-2 GTCs/year, until he was consistently taking levetiracetam and became seizure free since .  He denied adverse effects of levetiracetam.  A levetiracetam level was 42 mccg/ml on 2018.       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   2)  Probable migraine headaches.       PERSONAL AND SOCIAL HISTORY:  He grew up in [place] [facility].  He completed a baccalaureate degree.  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  He lives with his wife.  He has 2-3 drinks of alcohol once every two weeks.  He denies tobacco or recreational drug use. He      REVIEW OF SYSTEMS:  The patient denied history of weakness, tremors or other abnormal involuntary movements, numbness or tingling, incoordination, falling or difficulty in walking, urinary or fecal incontinence, headache and other pain, except as described above.  The patient denied any history of depression, suicidal ideation, continuous anxiety, panic attacks, hallucinations, delusions, psychosis, substance abuse, or psychiatric hospitalization.  He denied rashes and easy bruising.  The remainder of a 10-system symptom review was negative.     MEDICATIONS:  Levetiracetam 2000 mg b.i.d., and other medications as per the electronic medical record.      PHYSICAL EXAMINATION:    The patient was alert and in no apparent distress.  Vital signs were as per the electronic medical record.  Skull was normocephalic and atraumatic.  Neck was supple, without signs of meningeal irritation.                                             On neurological examination the patient appeared alert and was fully oriented to person, place, time, and reason for visit.  Speech showed normal articulation, fluency, repetitions, naming, syntax and comprehension.  Cranial nerves III through XII were normal.  Muscle masses, tones and strengths were normal throughout.  There was no pronator drift.  Sequential fine finger movements were performed normally with each hand.  No spontaneous tremors, myoclonus, or other abnormal movements were observed.  Sensations of light touch, pin prick, vibration and proprioception were reportedly normal throughout.  The rapid alternating movements, and finger-nose-finger and heel-shin  maneuvers were performed normally bilaterally.  Romberg maneuver was negative.  Regular, heel, toe, tandem and reverse tandem walking were normal.  Deep tendon reflexes were normal and symmetric throughout.  Toes were downgoing bilaterally.       IMPRESSION:    The patient has had no seizures since 2014.  He denied adverse effects of levetiracetam.   We will continue the current dose.  He did agree to complete an out-patient EEG, as we never received outside records of EEGs that may have been abnormal.  We also will check a levetiracetam level today.       He now is having severe headaches intermittently.  We will obtain a brain MRI to screen for cephalogenic lesions, although he may have common migraine headaches.  He declined to start sumatriptan or other anti-migraine therapy today, but he will consider this in the future after a brain MRI has been completed.     He has begun to experience situational anxiety that is quite bothersome.  Possibly he might have social phobia, but it would be best for him to have psychiatric evaluation to exclude CODY another pathologies.  He agreed with referral for behavioral health screening.     I reviewed [place] regulations on seizures and driving with the patient.  He appeared to clearly understand that he is prohibited from operating a motor vehicle within 3 months following any seizure or other episode with sudden unconsciousness or inability to sit up, and that he is required to report any future such seizure to the [facility] within 30 days after the event.       PLAN:    1.  Continue levetiracetam at the current dose.    2.  Obtain levetiracetam level.     3.  Return visit in approximately 11 months.      I spent 38 minutes in this patient care, with 21 minutes in direct patient contact, and 17 minutes in chart review and document preparation on the day of the visit.         Samuel Cody M.D.   Professor of Neurology

## 2025-01-14 NOTE — LETTER
2025       RE: Jatin Norris  : 1990   MRN: [id number]      Dear Colleague,    Thank you for referring your patient, Jatin Norris, to the [facility] at [facility]. Please see a copy of my visit note below.      [facility]psy [facility]:  RETURN VISIT           Service Date: 2025     HISTORY:  I saw Mr. 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  He came alone for the visit today.       Following the most recent visit on 2024, the patient has had no seizures and no medication adverse effects.   The most recent grand mal seizure occurred in      He has had headaches in the past, but these have been more severe over the last year.  On 4 occasions in , he had no aura prior to progressive, severe throbbing left frontal pain, associated with photophobia and phonophobia, not responding to acetaminophen or ibuprofen, which lasted 1-2 hours until in each case he was able to fall asleep, with no headache after sleeping.       Over the last year, he has had increasing anxiety, which mainly is triggered by preparation for and speaking to business colleagues or others, and by exposure to groups of more than a few people.  He denied continuous anxiety when alone and panic attacks.  He denied depression.     Ictal semiology-history:             The patient re-confirmed previously presented history in detail today. He again noted in conjunction with history on the medical record that he has had grand mal seizures as the only seizure type in his lifetime.     Generalized tonic clinic seizures began in approximately .  He had perhaps 10-20 total GTC seizures, not occurring more than once per day, with the last seizure in .  He did not have an aura or consistent warning of seizure onset, except rarely he had word-finding  difficulty just at seizure onset.  Generally he had unexpected low of consciousness, finding himself down and confused later, often with a bitten tongue but not with incontinence.  Seizures were witnessed by multiple observers who reported falling and brief generalized convulsions, followed by postictal confusion for minutes to hours.       The patient denied that he has ever had a paroxysmal episode of unresponsive staring without falling, non-convulsive falling with unconsciousness, repetitive involuntary movements or posturing, sudden brief confusion, or other paroxysmal phenomena than the grand mal seizures.  He denied any history of sudden involuntary jerks while fully awake, but he has had hypnic jerks.       The patient recalls considerable association of missed AED doses with seizure occurrence, before , but otherwise he does not recognize exacerbating or remitting factors with regard to seizure frequency.     Epilepsy-seizure predispositions:   The patient has no family history of epilepsy or seizures.  He has no history of gestational or  injury, febrile convulsions, developmental delay, stroke, meningitis, encephalitis, significant head injury, or other epileptic predispositions.  He denied a history of physical or sexual abuse by an adult during his childhood or adulthood.       Laboratory evaluations:   He reported that he was told that a brain MRI was normal and an EEG was abnormal (during in-patient VEEG), longer than 10 years ago, when he was living in [place].  He requested transfer of medical records several times, but records were not received at this clinic.       Epilepsy therapeutics:   He was initially treated with carbamazepine, but had hives.   He was then on lamotrigine, but seizures were not controlled.     He then went on to levetiracetam monotherapy but due to compliance and dosing issues, continued to have 1-2 GTCs/year, until he was consistently taking levetiracetam and  became seizure free since 2014.  He denied adverse effects of levetiracetam.  A levetiracetam level was 42 mccg/ml on 11/21/2018.       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   2)  Probable migraine headaches.      PERSONAL AND SOCIAL HISTORY:  He grew up in [facility].  He completed a baccalaureate degree.  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  He lives with his wife.  He has 2-3 drinks of alcohol once every two weeks.  He denies tobacco or recreational drug use. He      REVIEW OF SYSTEMS:  The patient denied history of weakness, tremors or other abnormal involuntary movements, numbness or tingling, incoordination, falling or difficulty in walking, urinary or fecal incontinence, headache and other pain, except as described above.  The patient denied any history of depression, suicidal ideation, continuous anxiety, panic attacks, hallucinations, delusions, psychosis, substance abuse, or psychiatric hospitalization.  He denied rashes and easy bruising.  The remainder of a 10-system symptom review was negative.     MEDICATIONS:  Levetiracetam 2000 mg b.i.d., and other medications as per the electronic medical record.      PHYSICAL EXAMINATION:    The patient was alert and in no apparent distress.  Vital signs were as per the electronic medical record.  Skull was normocephalic and atraumatic.  Neck was supple, without signs of meningeal irritation.                                             On neurological examination the patient appeared alert and was fully oriented to person, place, time, and reason for visit.  Speech showed normal articulation, fluency, repetitions, naming, syntax and comprehension.  Cranial nerves III through XII were normal.  Muscle masses, tones and strengths were normal throughout.  There was no pronator drift.  Sequential fine finger movements were  performed normally with each hand.  No spontaneous tremors, myoclonus, or other abnormal movements were observed.  Sensations of light touch, pin prick, vibration and proprioception were reportedly normal throughout.  The rapid alternating movements, and finger-nose-finger and heel-shin maneuvers were performed normally bilaterally.  Romberg maneuver was negative.  Regular, heel, toe, tandem and reverse tandem walking were normal.  Deep tendon reflexes were normal and symmetric throughout.  Toes were downgoing bilaterally.       IMPRESSION:    The patient has had no seizures since 2014.  He denied adverse effects of levetiracetam.   We will continue the current dose.  He did agree to complete an out-patient EEG, as we never received outside records of EEGs that may have been abnormal.  We also will check a levetiracetam level today.       He now is having severe headaches intermittently.  We will obtain a brain MRI to screen for cephalogenic lesions, although he may have common migraine headaches.  He declined to start sumatriptan or other anti-migraine therapy today, but he will consider this in the future after a brain MRI has been completed.     He has begun to experience situational anxiety that is quite bothersome.  Possibly he might have social phobia, but it would be best for him to have psychiatric evaluation to exclude CODY another pathologies.  He agreed with referral for behavioral health screening.     I reviewed [place] regulations on seizures and driving with the patient.  He appeared to clearly understand that he is prohibited from operating a motor vehicle within 3 months following any seizure or other episode with sudden unconsciousness or inability to sit up, and that he is required to report any future such seizure to the [facility] within 30 days after the event.       PLAN:    1.  Continue levetiracetam at the current dose.    2.  Obtain levetiracetam level.     3.  Return visit in approximately 11  months.      I spent 38 minutes in this patient care, with 21 minutes in direct patient contact, and 17 minutes in chart review and document preparation on the day of the visit.         Samuel Cody M.D.   Professor of Neurology      Again, thank you for allowing me to participate in the care of your patient.      Sincerely,    Samuel Cody MD

## 2025-01-15 LAB — LEVETIRACETAM SERPL-MCNC: 39.3 ÂΜG/ML (ref 10–40)

## 2025-04-05 ENCOUNTER — MYC REFILL (OUTPATIENT)
Dept: NEUROLOGY | Facility: CLINIC | Age: 35
End: 2025-04-05

## 2025-04-05 DIAGNOSIS — G40.309 EPILEPSY, GENERALIZED, CONVULSIVE (H): ICD-10-CM

## 2025-04-08 ENCOUNTER — TELEPHONE (OUTPATIENT)
Dept: NEUROLOGY | Facility: CLINIC | Age: 35
End: 2025-04-08

## 2025-04-08 NOTE — TELEPHONE ENCOUNTER
[facility] Call Center    Phone Message    May a detailed message be left on voicemail: yes     Reason for Call: Medication Refill Request    Has the patient contacted the pharmacy for the refill? Yes   Name of medication being requested: levETIRAcetam (KEPPRA) 1000 MG tablet   Provider who prescribed the medication: Dr. Cody  Pharmacy: [facility] [address] [place]  [place] - [address]  Date medication is needed: 4/11/25     Action Taken: Message routed to:  Other: [facility] [employer]    Travel Screening: Not Applicable     Date of Service:

## 2025-04-09 RX ORDER — LEVETIRACETAM 1000 MG/1
2000 TABLET ORAL 2 TIMES DAILY
Qty: 360 TABLET | Refills: 3 | OUTPATIENT
Start: 2025-04-09

## 2025-04-09 NOTE — TELEPHONE ENCOUNTER
levETIRAcetam (KEPPRA) 1000 MG tablet 360 tablet 3 1/14/2025 -- No   Sig - Route: Take 2 tablets (2,000 mg) by mouth 2 times daily. - Oral   Sent to pharmacy as: levETIRAcetam 1000 MG Oral Tablet (KEPPRA)   Class: E-Prescribe   Order: [id number]   E-Prescribing Status: Receipt confirmed by pharmacy (1/14/2025  8:45 PM CST)     [facility] [place] - [address]     Pharmacy was called and they confirm that they have refills available. They will contact patient when ready.     Steffany Quezada, RN  [facility] Triage & Med Refill Team

## 2025-04-09 NOTE — TELEPHONE ENCOUNTER
Refill denied due to still having refills on file.     Writer called [facility] in [place]. Pharmacy staff informed writer that prescription was refilled this AM and is ready to be picked up by patient.    Writer sent [employer]hart message to patient informing them that refill is ready to be picked up at pharmacy and that patient should have enough refills to last a year.

## 2025-07-19 ENCOUNTER — HEALTH MAINTENANCE LETTER (OUTPATIENT)
Age: 35
End: 2025-07-19